# Patient Record
Sex: MALE | Race: WHITE | NOT HISPANIC OR LATINO | Employment: FULL TIME | URBAN - METROPOLITAN AREA
[De-identification: names, ages, dates, MRNs, and addresses within clinical notes are randomized per-mention and may not be internally consistent; named-entity substitution may affect disease eponyms.]

---

## 2023-09-19 PROBLEM — F32.2 MAJOR DEPRESSIVE DISORDER, SINGLE EPISODE, SEVERE WITHOUT PSYCHOTIC FEATURES (MULTI): Status: ACTIVE | Noted: 2023-09-19

## 2023-09-19 PROBLEM — I10 HYPERTENSION: Status: ACTIVE | Noted: 2023-09-19

## 2023-09-19 PROBLEM — T14.8XXA FOREIGN BODY IN SKIN: Status: ACTIVE | Noted: 2023-09-19

## 2023-09-19 PROBLEM — I50.9 ACUTE CONGESTIVE HEART FAILURE (MULTI): Status: ACTIVE | Noted: 2023-09-19

## 2023-09-19 PROBLEM — R94.31 ELECTROCARDIOGRAM ABNORMAL: Status: ACTIVE | Noted: 2023-09-19

## 2023-09-19 PROBLEM — I50.22 CHRONIC SYSTOLIC HEART FAILURE (MULTI): Status: ACTIVE | Noted: 2023-09-19

## 2023-09-19 PROBLEM — E87.1 HYPONATREMIA: Status: ACTIVE | Noted: 2023-09-19

## 2023-09-19 PROBLEM — I48.0 PAROXYSMAL ATRIAL FIBRILLATION (MULTI): Status: ACTIVE | Noted: 2023-09-19

## 2023-09-19 PROBLEM — I48.91 A-FIB (MULTI): Status: ACTIVE | Noted: 2023-09-19

## 2023-09-19 PROBLEM — K29.70 GASTRITIS: Status: ACTIVE | Noted: 2023-09-19

## 2023-09-19 PROBLEM — F32.9 MAJOR DEPRESSIVE DISORDER: Status: ACTIVE | Noted: 2023-09-19

## 2023-09-19 PROBLEM — J20.9 ACUTE BRONCHITIS: Status: ACTIVE | Noted: 2023-09-19

## 2023-09-19 PROBLEM — I50.9 HEART FAILURE (MULTI): Status: ACTIVE | Noted: 2023-09-19

## 2023-09-19 PROBLEM — F19.10 POLYSUBSTANCE ABUSE (MULTI): Status: ACTIVE | Noted: 2023-09-19

## 2023-09-19 PROBLEM — F17.213 NICOTINE DEPENDENCE, CIGARETTES, WITH WITHDRAWAL: Status: ACTIVE | Noted: 2023-09-19

## 2023-09-19 PROBLEM — R10.13 EPIGASTRIC PAIN: Status: ACTIVE | Noted: 2023-09-19

## 2023-09-19 PROBLEM — R30.0 DYSURIA: Status: ACTIVE | Noted: 2023-09-19

## 2023-09-19 PROBLEM — I25.10 CAD (CORONARY ARTERY DISEASE): Status: ACTIVE | Noted: 2023-09-19

## 2023-09-19 PROBLEM — I25.10 CORONARY ARTERIOSCLEROSIS: Status: ACTIVE | Noted: 2023-09-19

## 2023-09-19 PROBLEM — I42.9 CARDIOMYOPATHY (MULTI): Status: ACTIVE | Noted: 2023-09-19

## 2023-09-19 PROBLEM — B02.9 HERPES ZOSTER: Status: ACTIVE | Noted: 2023-09-19

## 2023-09-19 PROBLEM — J44.9 COPD (CHRONIC OBSTRUCTIVE PULMONARY DISEASE) (MULTI): Status: ACTIVE | Noted: 2023-09-19

## 2023-09-19 PROBLEM — F10.10 ALCOHOL ABUSE, UNCOMPLICATED: Status: ACTIVE | Noted: 2023-09-19

## 2023-09-19 PROBLEM — R60.9 EDEMA: Status: ACTIVE | Noted: 2023-09-19

## 2023-09-19 PROBLEM — E78.5 HYPERLIPIDEMIA: Status: ACTIVE | Noted: 2023-09-19

## 2023-09-19 PROBLEM — N20.0 KIDNEY STONE: Status: ACTIVE | Noted: 2023-09-19

## 2023-09-19 PROBLEM — F99 INSOMNIA DUE TO OTHER MENTAL DISORDER: Status: ACTIVE | Noted: 2023-09-19

## 2023-09-19 PROBLEM — R33.8 ACUTE RETENTION OF URINE: Status: ACTIVE | Noted: 2023-09-19

## 2023-09-19 PROBLEM — F51.05 INSOMNIA DUE TO OTHER MENTAL DISORDER: Status: ACTIVE | Noted: 2023-09-19

## 2023-09-19 PROBLEM — R94.30 CARDIAC LV EJECTION FRACTION 21-30%: Status: ACTIVE | Noted: 2023-09-19

## 2023-09-19 PROBLEM — I25.5 ISCHEMIC CARDIOMYOPATHY: Status: ACTIVE | Noted: 2023-09-19

## 2023-09-19 PROBLEM — F14.10 COCAINE ABUSE (MULTI): Status: ACTIVE | Noted: 2023-09-19

## 2023-09-19 PROBLEM — R93.89 ABNORMAL COMPUTED TOMOGRAPHY SCAN: Status: ACTIVE | Noted: 2023-09-19

## 2023-09-19 PROBLEM — E11.9 DIABETES MELLITUS (MULTI): Status: ACTIVE | Noted: 2023-09-19

## 2023-09-19 RX ORDER — SUCRALFATE 1 G/1
1 TABLET ORAL EVERY 12 HOURS
COMMUNITY
Start: 2021-11-08

## 2023-09-19 RX ORDER — DICYCLOMINE HYDROCHLORIDE 10 MG/1
1 CAPSULE ORAL DAILY
COMMUNITY
Start: 2021-11-05

## 2023-09-19 RX ORDER — ASPIRIN 81 MG/1
1 TABLET ORAL DAILY
COMMUNITY
Start: 2021-10-07

## 2023-09-19 RX ORDER — PANTOPRAZOLE SODIUM 40 MG/1
1 TABLET, DELAYED RELEASE ORAL DAILY
COMMUNITY
Start: 2022-12-01

## 2023-09-19 RX ORDER — OXYCODONE AND ACETAMINOPHEN 5; 325 MG/1; MG/1
1 TABLET ORAL AS NEEDED
COMMUNITY

## 2023-09-19 RX ORDER — LANOLIN ALCOHOL/MO/W.PET/CERES
1 CREAM (GRAM) TOPICAL DAILY
COMMUNITY
Start: 2021-11-05

## 2023-09-19 RX ORDER — METOPROLOL SUCCINATE 50 MG/1
1 TABLET, EXTENDED RELEASE ORAL DAILY
Status: ON HOLD | COMMUNITY
Start: 2022-12-01 | End: 2023-12-25 | Stop reason: SDUPTHER

## 2023-09-19 RX ORDER — METFORMIN HYDROCHLORIDE 500 MG/1
1 TABLET ORAL 2 TIMES DAILY
COMMUNITY
Start: 2021-10-03

## 2023-09-19 RX ORDER — CARVEDILOL 3.12 MG/1
1 TABLET ORAL
COMMUNITY
Start: 2013-04-09

## 2023-09-19 RX ORDER — CIPROFLOXACIN 500 MG/1
1 TABLET ORAL EVERY 12 HOURS
COMMUNITY
End: 2023-12-25 | Stop reason: HOSPADM

## 2023-09-19 RX ORDER — SPIRONOLACTONE 25 MG/1
1 TABLET ORAL DAILY
COMMUNITY
Start: 2021-10-03

## 2023-09-19 RX ORDER — METOPROLOL TARTRATE 100 MG/1
1 TABLET ORAL EVERY 12 HOURS
COMMUNITY
Start: 2021-10-03 | End: 2023-12-25 | Stop reason: HOSPADM

## 2023-09-19 RX ORDER — FUROSEMIDE 40 MG/1
1 TABLET ORAL DAILY
COMMUNITY
Start: 2022-08-22

## 2023-09-19 RX ORDER — LISINOPRIL 5 MG/1
1 TABLET ORAL DAILY
COMMUNITY
Start: 2013-04-09 | End: 2023-12-25 | Stop reason: HOSPADM

## 2023-09-19 RX ORDER — ATORVASTATIN CALCIUM 40 MG/1
1 TABLET, FILM COATED ORAL DAILY
COMMUNITY
Start: 2021-10-03

## 2023-09-19 RX ORDER — DAPAGLIFLOZIN 10 MG/1
1 TABLET, FILM COATED ORAL DAILY
COMMUNITY
Start: 2021-10-07

## 2023-10-02 ENCOUNTER — HOSPITAL ENCOUNTER (OUTPATIENT)
Dept: CARDIOLOGY | Facility: CLINIC | Age: 53
Discharge: HOME | End: 2023-10-02

## 2023-10-10 DIAGNOSIS — Z95.810 AICD (AUTOMATIC CARDIOVERTER/DEFIBRILLATOR) PRESENT: ICD-10-CM

## 2023-10-10 DIAGNOSIS — I50.43 ACUTE ON CHRONIC COMBINED SYSTOLIC AND DIASTOLIC HEART FAILURE (MULTI): ICD-10-CM

## 2023-10-10 DIAGNOSIS — I25.5 ISCHEMIC CARDIOMYOPATHY: Primary | ICD-10-CM

## 2023-10-10 DIAGNOSIS — I48.0 PAROXYSMAL ATRIAL FIBRILLATION (MULTI): ICD-10-CM

## 2023-10-23 ENCOUNTER — HOSPITAL ENCOUNTER (OUTPATIENT)
Dept: CARDIOLOGY | Facility: CLINIC | Age: 53
Discharge: HOME | End: 2023-10-23

## 2023-10-23 DIAGNOSIS — I48.0 PAROXYSMAL ATRIAL FIBRILLATION (MULTI): ICD-10-CM

## 2023-10-23 DIAGNOSIS — I25.5 ISCHEMIC CARDIOMYOPATHY: ICD-10-CM

## 2023-10-23 DIAGNOSIS — Z95.810 AICD (AUTOMATIC CARDIOVERTER/DEFIBRILLATOR) PRESENT: ICD-10-CM

## 2023-10-23 DIAGNOSIS — I50.43 ACUTE ON CHRONIC COMBINED SYSTOLIC AND DIASTOLIC HEART FAILURE (MULTI): ICD-10-CM

## 2023-11-21 ENCOUNTER — HOSPITAL ENCOUNTER (OUTPATIENT)
Dept: CARDIOLOGY | Facility: CLINIC | Age: 53
Discharge: HOME | End: 2023-11-21

## 2023-12-11 ENCOUNTER — HOSPITAL ENCOUNTER (OUTPATIENT)
Dept: CARDIOLOGY | Facility: CLINIC | Age: 53
Discharge: HOME | End: 2023-12-11

## 2023-12-18 ENCOUNTER — HOSPITAL ENCOUNTER (OUTPATIENT)
Dept: CARDIOLOGY | Facility: CLINIC | Age: 53
Discharge: HOME | End: 2023-12-18

## 2023-12-22 ENCOUNTER — HOSPITAL ENCOUNTER (OUTPATIENT)
Facility: HOSPITAL | Age: 53
Setting detail: OBSERVATION
Discharge: HOME | End: 2023-12-25
Attending: STUDENT IN AN ORGANIZED HEALTH CARE EDUCATION/TRAINING PROGRAM | Admitting: INTERNAL MEDICINE

## 2023-12-22 ENCOUNTER — APPOINTMENT (OUTPATIENT)
Dept: CARDIOLOGY | Facility: HOSPITAL | Age: 53
End: 2023-12-22

## 2023-12-22 ENCOUNTER — APPOINTMENT (OUTPATIENT)
Dept: RADIOLOGY | Facility: HOSPITAL | Age: 53
End: 2023-12-22

## 2023-12-22 DIAGNOSIS — I50.9 ACUTE ON CHRONIC HEART FAILURE, UNSPECIFIED HEART FAILURE TYPE (MULTI): ICD-10-CM

## 2023-12-22 DIAGNOSIS — I50.9 ACUTE ON CHRONIC CONGESTIVE HEART FAILURE, UNSPECIFIED HEART FAILURE TYPE (MULTI): Primary | ICD-10-CM

## 2023-12-22 DIAGNOSIS — R06.02 SHORTNESS OF BREATH: ICD-10-CM

## 2023-12-22 DIAGNOSIS — R09.02 HYPOXEMIA: ICD-10-CM

## 2023-12-22 LAB
ALBUMIN SERPL-MCNC: 3.3 G/DL (ref 3.5–5)
ALP BLD-CCNC: 147 U/L (ref 35–125)
ALT SERPL-CCNC: 12 U/L (ref 5–40)
AMPHETAMINES UR QL SCN>1000 NG/ML: NEGATIVE
ANION GAP SERPL CALC-SCNC: 13 MMOL/L
APPEARANCE UR: CLEAR
AST SERPL-CCNC: 19 U/L (ref 5–40)
BARBITURATES UR QL SCN>300 NG/ML: NEGATIVE
BASOPHILS # BLD AUTO: 0.09 X10*3/UL (ref 0–0.1)
BASOPHILS NFR BLD AUTO: 1.3 %
BENZODIAZ UR QL SCN>300 NG/ML: NEGATIVE
BILIRUB SERPL-MCNC: 2.1 MG/DL (ref 0.1–1.2)
BILIRUB UR STRIP.AUTO-MCNC: ABNORMAL MG/DL
BUN SERPL-MCNC: 18 MG/DL (ref 8–25)
BZE UR QL SCN>300 NG/ML: POSITIVE
CALCIUM SERPL-MCNC: 8.1 MG/DL (ref 8.5–10.4)
CANNABINOIDS UR QL SCN>50 NG/ML: NEGATIVE
CHLORIDE SERPL-SCNC: 102 MMOL/L (ref 97–107)
CO2 SERPL-SCNC: 21 MMOL/L (ref 24–31)
COLOR UR: YELLOW
CREAT SERPL-MCNC: 0.9 MG/DL (ref 0.4–1.6)
EOSINOPHIL # BLD AUTO: 0.15 X10*3/UL (ref 0–0.7)
EOSINOPHIL NFR BLD AUTO: 2.1 %
ERYTHROCYTE [DISTWIDTH] IN BLOOD BY AUTOMATED COUNT: 17.2 % (ref 11.5–14.5)
FENTANYL+NORFENTANYL UR QL SCN: NEGATIVE
FLUAV RNA RESP QL NAA+PROBE: NOT DETECTED
FLUBV RNA RESP QL NAA+PROBE: NOT DETECTED
GFR SERPL CREATININE-BSD FRML MDRD: >90 ML/MIN/1.73M*2
GLUCOSE BLD MANUAL STRIP-MCNC: 138 MG/DL (ref 74–99)
GLUCOSE SERPL-MCNC: 115 MG/DL (ref 65–99)
GLUCOSE UR STRIP.AUTO-MCNC: NORMAL MG/DL
HCT VFR BLD AUTO: 42.5 % (ref 41–52)
HGB BLD-MCNC: 12.9 G/DL (ref 13.5–17.5)
HOLD SPECIMEN: NORMAL
IMM GRANULOCYTES # BLD AUTO: 0.02 X10*3/UL (ref 0–0.7)
IMM GRANULOCYTES NFR BLD AUTO: 0.3 % (ref 0–0.9)
KETONES UR STRIP.AUTO-MCNC: NEGATIVE MG/DL
LEUKOCYTE ESTERASE UR QL STRIP.AUTO: NEGATIVE
LYMPHOCYTES # BLD AUTO: 2.16 X10*3/UL (ref 1.2–4.8)
LYMPHOCYTES NFR BLD AUTO: 30.5 %
MAGNESIUM SERPL-MCNC: 2.1 MG/DL (ref 1.6–3.1)
MAGNESIUM SERPL-MCNC: 2.2 MG/DL (ref 1.6–3.1)
MCH RBC QN AUTO: 26 PG (ref 26–34)
MCHC RBC AUTO-ENTMCNC: 30.4 G/DL (ref 32–36)
MCV RBC AUTO: 86 FL (ref 80–100)
METHADONE UR QL SCN>300 NG/ML: NEGATIVE
MONOCYTES # BLD AUTO: 0.86 X10*3/UL (ref 0.1–1)
MONOCYTES NFR BLD AUTO: 12.1 %
MUCOUS THREADS #/AREA URNS AUTO: NORMAL /LPF
NEUTROPHILS # BLD AUTO: 3.81 X10*3/UL (ref 1.2–7.7)
NEUTROPHILS NFR BLD AUTO: 53.7 %
NITRITE UR QL STRIP.AUTO: NEGATIVE
NRBC BLD-RTO: 0 /100 WBCS (ref 0–0)
NT-PROBNP SERPL-MCNC: 1765 PG/ML (ref 0–138)
OPIATES UR QL SCN>300 NG/ML: NEGATIVE
OXYCODONE UR QL: NEGATIVE
PCP UR QL SCN>25 NG/ML: NEGATIVE
PH UR STRIP.AUTO: 5.5 [PH]
PLATELET # BLD AUTO: 265 X10*3/UL (ref 150–450)
POTASSIUM SERPL-SCNC: 4 MMOL/L (ref 3.4–5.1)
PROT SERPL-MCNC: 6.8 G/DL (ref 5.9–7.9)
PROT UR STRIP.AUTO-MCNC: ABNORMAL MG/DL
Q ONSET: 205 MS
QRS COUNT: 17 BEATS
QRS DURATION: 110 MS
QT INTERVAL: 378 MS
QTC CALCULATION(BAZETT): 504 MS
QTC FREDERICIA: 458 MS
R AXIS: -47 DEGREES
RBC # BLD AUTO: 4.97 X10*6/UL (ref 4.5–5.9)
RBC # UR STRIP.AUTO: NEGATIVE /UL
RBC #/AREA URNS AUTO: NORMAL /HPF
RSV RNA RESP QL NAA+PROBE: NOT DETECTED
SARS-COV-2 RNA RESP QL NAA+PROBE: NOT DETECTED
SODIUM SERPL-SCNC: 136 MMOL/L (ref 133–145)
SP GR UR STRIP.AUTO: 1.03
T AXIS: 128 DEGREES
T OFFSET: 394 MS
TROPONIN T SERPL-MCNC: 23 NG/L
TROPONIN T SERPL-MCNC: 25 NG/L
TROPONIN T SERPL-MCNC: 25 NG/L
UROBILINOGEN UR STRIP.AUTO-MCNC: ABNORMAL MG/DL
VENTRICULAR RATE: 107 BPM
WBC # BLD AUTO: 7.1 X10*3/UL (ref 4.4–11.3)
WBC #/AREA URNS AUTO: NORMAL /HPF

## 2023-12-22 PROCEDURE — 2500000004 HC RX 250 GENERAL PHARMACY W/ HCPCS (ALT 636 FOR OP/ED)

## 2023-12-22 PROCEDURE — 36415 COLL VENOUS BLD VENIPUNCTURE: CPT | Performed by: EMERGENCY MEDICINE

## 2023-12-22 PROCEDURE — 80053 COMPREHEN METABOLIC PANEL: CPT | Performed by: EMERGENCY MEDICINE

## 2023-12-22 PROCEDURE — 82947 ASSAY GLUCOSE BLOOD QUANT: CPT

## 2023-12-22 PROCEDURE — 80307 DRUG TEST PRSMV CHEM ANLYZR: CPT | Performed by: STUDENT IN AN ORGANIZED HEALTH CARE EDUCATION/TRAINING PROGRAM

## 2023-12-22 PROCEDURE — 71045 X-RAY EXAM CHEST 1 VIEW: CPT

## 2023-12-22 PROCEDURE — 84484 ASSAY OF TROPONIN QUANT: CPT | Performed by: EMERGENCY MEDICINE

## 2023-12-22 PROCEDURE — 2500000002 HC RX 250 W HCPCS SELF ADMINISTERED DRUGS (ALT 637 FOR MEDICARE OP, ALT 636 FOR OP/ED): Performed by: INTERNAL MEDICINE

## 2023-12-22 PROCEDURE — 2500000004 HC RX 250 GENERAL PHARMACY W/ HCPCS (ALT 636 FOR OP/ED): Performed by: INTERNAL MEDICINE

## 2023-12-22 PROCEDURE — 83735 ASSAY OF MAGNESIUM: CPT | Performed by: INTERNAL MEDICINE

## 2023-12-22 PROCEDURE — 83880 ASSAY OF NATRIURETIC PEPTIDE: CPT | Performed by: EMERGENCY MEDICINE

## 2023-12-22 PROCEDURE — 2500000004 HC RX 250 GENERAL PHARMACY W/ HCPCS (ALT 636 FOR OP/ED): Performed by: STUDENT IN AN ORGANIZED HEALTH CARE EDUCATION/TRAINING PROGRAM

## 2023-12-22 PROCEDURE — 2500000001 HC RX 250 WO HCPCS SELF ADMINISTERED DRUGS (ALT 637 FOR MEDICARE OP): Performed by: INTERNAL MEDICINE

## 2023-12-22 PROCEDURE — 93005 ELECTROCARDIOGRAM TRACING: CPT

## 2023-12-22 PROCEDURE — 85025 COMPLETE CBC W/AUTO DIFF WBC: CPT | Performed by: EMERGENCY MEDICINE

## 2023-12-22 PROCEDURE — 83735 ASSAY OF MAGNESIUM: CPT | Performed by: EMERGENCY MEDICINE

## 2023-12-22 PROCEDURE — 84075 ASSAY ALKALINE PHOSPHATASE: CPT | Performed by: EMERGENCY MEDICINE

## 2023-12-22 PROCEDURE — 81001 URINALYSIS AUTO W/SCOPE: CPT | Performed by: STUDENT IN AN ORGANIZED HEALTH CARE EDUCATION/TRAINING PROGRAM

## 2023-12-22 PROCEDURE — 99285 EMERGENCY DEPT VISIT HI MDM: CPT | Performed by: STUDENT IN AN ORGANIZED HEALTH CARE EDUCATION/TRAINING PROGRAM

## 2023-12-22 PROCEDURE — S0166 INJ OLANZAPINE 2.5MG: HCPCS | Performed by: STUDENT IN AN ORGANIZED HEALTH CARE EDUCATION/TRAINING PROGRAM

## 2023-12-22 PROCEDURE — 87637 SARSCOV2&INF A&B&RSV AMP PRB: CPT | Performed by: STUDENT IN AN ORGANIZED HEALTH CARE EDUCATION/TRAINING PROGRAM

## 2023-12-22 PROCEDURE — G0378 HOSPITAL OBSERVATION PER HR: HCPCS

## 2023-12-22 RX ORDER — DIPHENHYDRAMINE HYDROCHLORIDE 50 MG/ML
25 INJECTION INTRAMUSCULAR; INTRAVENOUS ONCE
Status: COMPLETED | OUTPATIENT
Start: 2023-12-22 | End: 2023-12-22

## 2023-12-22 RX ORDER — ASPIRIN 81 MG/1
81 TABLET ORAL DAILY
Status: DISCONTINUED | OUTPATIENT
Start: 2023-12-22 | End: 2023-12-25 | Stop reason: HOSPADM

## 2023-12-22 RX ORDER — SPIRONOLACTONE 25 MG/1
25 TABLET ORAL DAILY
Status: DISCONTINUED | OUTPATIENT
Start: 2023-12-22 | End: 2023-12-25 | Stop reason: HOSPADM

## 2023-12-22 RX ORDER — FUROSEMIDE 40 MG/1
40 TABLET ORAL DAILY
Status: DISCONTINUED | OUTPATIENT
Start: 2023-12-22 | End: 2023-12-22 | Stop reason: ALTCHOICE

## 2023-12-22 RX ORDER — ATORVASTATIN CALCIUM 40 MG/1
40 TABLET, FILM COATED ORAL NIGHTLY
Status: DISCONTINUED | OUTPATIENT
Start: 2023-12-22 | End: 2023-12-25 | Stop reason: HOSPADM

## 2023-12-22 RX ORDER — MAGNESIUM SULFATE HEPTAHYDRATE 40 MG/ML
2 INJECTION, SOLUTION INTRAVENOUS ONCE
Status: DISCONTINUED | OUTPATIENT
Start: 2023-12-22 | End: 2023-12-22 | Stop reason: ALTCHOICE

## 2023-12-22 RX ORDER — CARVEDILOL 3.12 MG/1
3.12 TABLET ORAL
Status: DISCONTINUED | OUTPATIENT
Start: 2023-12-22 | End: 2023-12-25 | Stop reason: HOSPADM

## 2023-12-22 RX ORDER — OXYCODONE AND ACETAMINOPHEN 5; 325 MG/1; MG/1
1 TABLET ORAL DAILY PRN
Status: DISCONTINUED | OUTPATIENT
Start: 2023-12-22 | End: 2023-12-25 | Stop reason: HOSPADM

## 2023-12-22 RX ORDER — LANOLIN ALCOHOL/MO/W.PET/CERES
100 CREAM (GRAM) TOPICAL DAILY
Status: DISCONTINUED | OUTPATIENT
Start: 2023-12-22 | End: 2023-12-25 | Stop reason: HOSPADM

## 2023-12-22 RX ORDER — MAGNESIUM SULFATE 4 G/50ML
4 INJECTION INTRAVENOUS ONCE AS NEEDED
Status: DISCONTINUED | OUTPATIENT
Start: 2023-12-22 | End: 2023-12-25 | Stop reason: HOSPADM

## 2023-12-22 RX ORDER — ACETAMINOPHEN 650 MG/1
650 SUPPOSITORY RECTAL EVERY 6 HOURS PRN
Status: DISCONTINUED | OUTPATIENT
Start: 2023-12-22 | End: 2023-12-25 | Stop reason: HOSPADM

## 2023-12-22 RX ORDER — DICYCLOMINE HYDROCHLORIDE 10 MG/1
10 CAPSULE ORAL DAILY
Status: DISCONTINUED | OUTPATIENT
Start: 2023-12-22 | End: 2023-12-25 | Stop reason: HOSPADM

## 2023-12-22 RX ORDER — SUCRALFATE 1 G/1
1 TABLET ORAL EVERY 12 HOURS SCHEDULED
Status: DISCONTINUED | OUTPATIENT
Start: 2023-12-22 | End: 2023-12-25 | Stop reason: HOSPADM

## 2023-12-22 RX ORDER — METOPROLOL TARTRATE 100 MG/1
100 TABLET ORAL EVERY 12 HOURS
Status: DISCONTINUED | OUTPATIENT
Start: 2023-12-22 | End: 2023-12-22 | Stop reason: ALTCHOICE

## 2023-12-22 RX ORDER — OLANZAPINE 10 MG/2ML
2.5 INJECTION, POWDER, FOR SOLUTION INTRAMUSCULAR ONCE
Status: COMPLETED | OUTPATIENT
Start: 2023-12-22 | End: 2023-12-22

## 2023-12-22 RX ORDER — ACETAMINOPHEN 325 MG/1
650 TABLET ORAL EVERY 6 HOURS PRN
Status: DISCONTINUED | OUTPATIENT
Start: 2023-12-22 | End: 2023-12-25 | Stop reason: HOSPADM

## 2023-12-22 RX ORDER — FUROSEMIDE 10 MG/ML
40 INJECTION INTRAMUSCULAR; INTRAVENOUS EVERY 8 HOURS
Status: COMPLETED | OUTPATIENT
Start: 2023-12-22 | End: 2023-12-23

## 2023-12-22 RX ORDER — DAPAGLIFLOZIN 10 MG/1
10 TABLET, FILM COATED ORAL DAILY
Status: DISCONTINUED | OUTPATIENT
Start: 2023-12-22 | End: 2023-12-25 | Stop reason: HOSPADM

## 2023-12-22 RX ORDER — LISINOPRIL 5 MG/1
5 TABLET ORAL DAILY
Status: DISCONTINUED | OUTPATIENT
Start: 2023-12-22 | End: 2023-12-22 | Stop reason: ALTCHOICE

## 2023-12-22 RX ORDER — MIDAZOLAM HYDROCHLORIDE 1 MG/ML
2 INJECTION, SOLUTION INTRAMUSCULAR; INTRAVENOUS ONCE
Status: COMPLETED | OUTPATIENT
Start: 2023-12-22 | End: 2023-12-22

## 2023-12-22 RX ORDER — METOPROLOL SUCCINATE 50 MG/1
50 TABLET, EXTENDED RELEASE ORAL DAILY
Status: DISCONTINUED | OUTPATIENT
Start: 2023-12-22 | End: 2023-12-22 | Stop reason: ALTCHOICE

## 2023-12-22 RX ORDER — METFORMIN HYDROCHLORIDE 500 MG/1
500 TABLET ORAL
Status: DISCONTINUED | OUTPATIENT
Start: 2023-12-22 | End: 2023-12-25 | Stop reason: HOSPADM

## 2023-12-22 RX ORDER — MAGNESIUM SULFATE HEPTAHYDRATE 40 MG/ML
2 INJECTION, SOLUTION INTRAVENOUS ONCE AS NEEDED
Status: DISCONTINUED | OUTPATIENT
Start: 2023-12-22 | End: 2023-12-25 | Stop reason: HOSPADM

## 2023-12-22 RX ORDER — PANTOPRAZOLE SODIUM 40 MG/1
40 TABLET, DELAYED RELEASE ORAL
Status: DISCONTINUED | OUTPATIENT
Start: 2023-12-23 | End: 2023-12-25 | Stop reason: HOSPADM

## 2023-12-22 RX ORDER — HEPARIN SODIUM 5000 [USP'U]/ML
5000 INJECTION, SOLUTION INTRAVENOUS; SUBCUTANEOUS EVERY 8 HOURS SCHEDULED
Status: DISCONTINUED | OUTPATIENT
Start: 2023-12-22 | End: 2023-12-22 | Stop reason: ALTCHOICE

## 2023-12-22 RX ORDER — ACETAMINOPHEN 160 MG/5ML
650 SOLUTION ORAL EVERY 6 HOURS PRN
Status: DISCONTINUED | OUTPATIENT
Start: 2023-12-22 | End: 2023-12-25 | Stop reason: HOSPADM

## 2023-12-22 RX ORDER — POTASSIUM CHLORIDE 20 MEQ/1
20 TABLET, EXTENDED RELEASE ORAL DAILY
Status: DISCONTINUED | OUTPATIENT
Start: 2023-12-22 | End: 2023-12-25 | Stop reason: HOSPADM

## 2023-12-22 RX ADMIN — DAPAGLIFLOZIN 10 MG: 10 TABLET, FILM COATED ORAL at 17:02

## 2023-12-22 RX ADMIN — POTASSIUM CHLORIDE 20 MEQ: 1500 TABLET, EXTENDED RELEASE ORAL at 16:18

## 2023-12-22 RX ADMIN — METFORMIN HYDROCHLORIDE 500 MG: 500 TABLET, FILM COATED ORAL at 16:18

## 2023-12-22 RX ADMIN — CARVEDILOL 3.12 MG: 3.12 TABLET, FILM COATED ORAL at 16:18

## 2023-12-22 RX ADMIN — MIDAZOLAM HYDROCHLORIDE 2 MG: 1 INJECTION, SOLUTION INTRAMUSCULAR; INTRAVENOUS at 07:15

## 2023-12-22 RX ADMIN — ATORVASTATIN CALCIUM 40 MG: 40 TABLET, FILM COATED ORAL at 21:42

## 2023-12-22 RX ADMIN — OLANZAPINE 2.5 MG: 10 INJECTION, POWDER, FOR SOLUTION INTRAMUSCULAR at 07:25

## 2023-12-22 RX ADMIN — DICYCLOMINE HYDROCHLORIDE 10 MG: 10 CAPSULE ORAL at 16:18

## 2023-12-22 RX ADMIN — SPIRONOLACTONE 25 MG: 25 TABLET ORAL at 16:18

## 2023-12-22 RX ADMIN — SACUBITRIL AND VALSARTAN 1 TABLET: 24; 26 TABLET, FILM COATED ORAL at 22:33

## 2023-12-22 RX ADMIN — SUCRALFATE 1 G: 1 TABLET ORAL at 21:42

## 2023-12-22 RX ADMIN — DIPHENHYDRAMINE HYDROCHLORIDE 25 MG: 50 INJECTION, SOLUTION INTRAMUSCULAR; INTRAVENOUS at 06:36

## 2023-12-22 RX ADMIN — FUROSEMIDE 40 MG: 10 INJECTION, SOLUTION INTRAMUSCULAR; INTRAVENOUS at 22:42

## 2023-12-22 RX ADMIN — FUROSEMIDE 40 MG: 10 INJECTION, SOLUTION INTRAMUSCULAR; INTRAVENOUS at 16:18

## 2023-12-22 RX ADMIN — THIAMINE HCL TAB 100 MG 100 MG: 100 TAB at 16:18

## 2023-12-22 RX ADMIN — ASPIRIN 81 MG: 81 TABLET, COATED ORAL at 16:18

## 2023-12-22 RX ADMIN — RIVAROXABAN 20 MG: 20 TABLET, FILM COATED ORAL at 16:18

## 2023-12-22 SDOH — SOCIAL STABILITY: SOCIAL INSECURITY: DO YOU FEEL UNSAFE GOING BACK TO THE PLACE WHERE YOU ARE LIVING?: NO

## 2023-12-22 SDOH — SOCIAL STABILITY: SOCIAL INSECURITY: ARE THERE ANY APPARENT SIGNS OF INJURIES/BEHAVIORS THAT COULD BE RELATED TO ABUSE/NEGLECT?: NO

## 2023-12-22 SDOH — SOCIAL STABILITY: SOCIAL INSECURITY: ARE YOU OR HAVE YOU BEEN THREATENED OR ABUSED PHYSICALLY, EMOTIONALLY, OR SEXUALLY BY ANYONE?: NO

## 2023-12-22 SDOH — SOCIAL STABILITY: SOCIAL INSECURITY: WERE YOU ABLE TO COMPLETE ALL THE BEHAVIORAL HEALTH SCREENINGS?: YES

## 2023-12-22 SDOH — SOCIAL STABILITY: SOCIAL INSECURITY: HAS ANYONE EVER THREATENED TO HURT YOUR FAMILY OR YOUR PETS?: NO

## 2023-12-22 SDOH — SOCIAL STABILITY: SOCIAL INSECURITY: ABUSE: ADULT

## 2023-12-22 SDOH — SOCIAL STABILITY: SOCIAL INSECURITY: HAVE YOU HAD THOUGHTS OF HARMING ANYONE ELSE?: NO

## 2023-12-22 SDOH — SOCIAL STABILITY: SOCIAL INSECURITY: DOES ANYONE TRY TO KEEP YOU FROM HAVING/CONTACTING OTHER FRIENDS OR DOING THINGS OUTSIDE YOUR HOME?: NO

## 2023-12-22 SDOH — SOCIAL STABILITY: SOCIAL INSECURITY: DO YOU FEEL ANYONE HAS EXPLOITED OR TAKEN ADVANTAGE OF YOU FINANCIALLY OR OF YOUR PERSONAL PROPERTY?: NO

## 2023-12-22 ASSESSMENT — LIFESTYLE VARIABLES
ORIENTATION AND CLOUDING OF SENSORIUM: ORIENTED AND CAN DO SERIAL ADDITIONS
HOW MANY STANDARD DRINKS CONTAINING ALCOHOL DO YOU HAVE ON A TYPICAL DAY: PATIENT DOES NOT DRINK
EVER HAD A DRINK FIRST THING IN THE MORNING TO STEADY YOUR NERVES TO GET RID OF A HANGOVER: NO
AGITATION: SOMEWHAT MORE THAN NORMAL ACTIVITY
HEADACHE, FULLNESS IN HEAD: NOT PRESENT
HOW OFTEN DO YOU HAVE A DRINK CONTAINING ALCOHOL: NEVER
TOTAL SCORE: 2
HAVE PEOPLE ANNOYED YOU BY CRITICIZING YOUR DRINKING: NO
VISUAL DISTURBANCES: NOT PRESENT
PAROXYSMAL SWEATS: NO SWEAT VISIBLE
NAUSEA AND VOMITING: NO NAUSEA AND NO VOMITING
HOW OFTEN DO YOU HAVE 6 OR MORE DRINKS ON ONE OCCASION: NEVER
SKIP TO QUESTIONS 9-10: 1
REASON UNABLE TO ASSESS: NO
EVER FELT BAD OR GUILTY ABOUT YOUR DRINKING: NO
ANXIETY: MILDLY ANXIOUS
AUDIT-C TOTAL SCORE: 0
AUDIT-C TOTAL SCORE: 0
TREMOR: NO TREMOR
HAVE YOU EVER FELT YOU SHOULD CUT DOWN ON YOUR DRINKING: NO
AUDITORY DISTURBANCES: NOT PRESENT

## 2023-12-22 ASSESSMENT — COGNITIVE AND FUNCTIONAL STATUS - GENERAL
PATIENT BASELINE BEDBOUND: NO
MOBILITY SCORE: 24
DAILY ACTIVITIY SCORE: 24

## 2023-12-22 ASSESSMENT — PATIENT HEALTH QUESTIONNAIRE - PHQ9
1. LITTLE INTEREST OR PLEASURE IN DOING THINGS: NOT AT ALL
2. FEELING DOWN, DEPRESSED OR HOPELESS: NOT AT ALL
SUM OF ALL RESPONSES TO PHQ9 QUESTIONS 1 & 2: 0

## 2023-12-22 ASSESSMENT — ACTIVITIES OF DAILY LIVING (ADL)
DRESSING YOURSELF: INDEPENDENT
WALKS IN HOME: INDEPENDENT
LACK_OF_TRANSPORTATION: NO
FEEDING YOURSELF: INDEPENDENT
BATHING: INDEPENDENT
JUDGMENT_ADEQUATE_SAFELY_COMPLETE_DAILY_ACTIVITIES: NO
GROOMING: INDEPENDENT
ADEQUATE_TO_COMPLETE_ADL: NO
HEARING - LEFT EAR: FUNCTIONAL
PATIENT'S MEMORY ADEQUATE TO SAFELY COMPLETE DAILY ACTIVITIES?: NO
HEARING - RIGHT EAR: FUNCTIONAL
TOILETING: INDEPENDENT

## 2023-12-22 ASSESSMENT — PAIN SCALES - GENERAL
PAINLEVEL_OUTOF10: 10 - WORST POSSIBLE PAIN
PAINLEVEL_OUTOF10: 10 - WORST POSSIBLE PAIN
PAINLEVEL_OUTOF10: 0 - NO PAIN

## 2023-12-22 ASSESSMENT — COLUMBIA-SUICIDE SEVERITY RATING SCALE - C-SSRS
5. HAVE YOU STARTED TO WORK OUT OR WORKED OUT THE DETAILS OF HOW TO KILL YOURSELF? DO YOU INTEND TO CARRY OUT THIS PLAN?: NO
4. HAVE YOU HAD THESE THOUGHTS AND HAD SOME INTENTION OF ACTING ON THEM?: NO
6. HAVE YOU EVER DONE ANYTHING, STARTED TO DO ANYTHING, OR PREPARED TO DO ANYTHING TO END YOUR LIFE?: NO
1. IN THE PAST MONTH, HAVE YOU WISHED YOU WERE DEAD OR WISHED YOU COULD GO TO SLEEP AND NOT WAKE UP?: YES
2. HAVE YOU ACTUALLY HAD ANY THOUGHTS OF KILLING YOURSELF?: YES

## 2023-12-22 ASSESSMENT — PAIN - FUNCTIONAL ASSESSMENT
PAIN_FUNCTIONAL_ASSESSMENT: 0-10
PAIN_FUNCTIONAL_ASSESSMENT: 0-10

## 2023-12-22 NOTE — CARE PLAN
Unable to assess pt at this time; pt appears to be uncomfortable. He is moving all over the bed; he has a rash all over his body and her E.D. notes, the pt c/o itching and burning.   Scabs noted on arms, scratches on abd.  Per MD notes, pt has a hx of CHF, HTN, A-Fib, DM, SOB and flu like sx.  No H & P at this time      DISCHARGE PLAN: DO NOT DISCHARGE PATIENT BEFORE SPEAKING WITH CARE COORDINATION; PT DOES NOT YET HAVE A SAFE DISCHARGE PLAN IN PLACE

## 2023-12-22 NOTE — ED NOTES
Rash Patient has body rash that itches and burns       Shortness of Breath Patient has shortness of breath 87% on ra 93% on 2l       Weakness, Gen Patient has general weakness and pain all over      Patient reports  that he had a defibrillator placed a few months ago and has gotten worse since then patient has had an itchy body rash that is prominent for 3 months that he says was never diagnosed      Marlena Lieberman, JENNIFER  12/22/23 0617

## 2023-12-22 NOTE — ED PROVIDER NOTES
HPI   Chief Complaint   Patient presents with    Rash     Patient has body rash that itches and burns    Shortness of Breath     Patient has shortness of breath 87% on ra 93% on 2l    Weakness, Gen     Patient has general weakness and pain all over       HPI  See MDM                  No data recorded                Patient History   No past medical history on file.  Past Surgical History:   Procedure Laterality Date    CARDIAC SURGERY       No family history on file.  Social History     Tobacco Use    Smoking status: Not on file    Smokeless tobacco: Not on file   Substance Use Topics    Alcohol use: Not on file    Drug use: Not on file       Physical Exam   ED Triage Vitals [12/22/23 0617]   Temp Heart Rate Resp BP   36.7 °C (98.1 °F) 105 20 (!) 154/113      SpO2 Temp Source Heart Rate Source Patient Position   96 % Oral Monitor Lying      BP Location FiO2 (%)     Left arm --       Physical Exam  See [facility]  ED Course & [facility]   ED Course as of 12/22/23 0805   Fri Dec 22, 2023   0611 EKG presented to me at 6:11 AM     EKG as interpreted by me shows atrial fibrillation at a rate of 107, left anterior fascicular block, no ST changes to suggest STEMI.   [DH]      ED Course User Index  [DH] Rory Haile MD         Diagnoses as of 12/22/23 0805   Acute on chronic congestive heart failure, unspecified heart failure type (CMS/HCC)   Shortness of breath   Hypoxemia       Medical Decision Making  53-year-old male with past medical history of CHF, hypertension, atrial fibrillation on Xarelto, diabetes presenting to the emergency room with complaints of shortness of breath and rash.  Patient notes that the shortness of breath has been going on for the last week and he also complains of flulike symptoms at the beginning of the week.  Patient also complains of rash all over his body namely his arms and his chest and notes that this has been going on for about a year.  Patient also complains of a great deal of difficulty with sleeping  and has not been sleeping well at home but otherwise denies any reported fevers, chills, nausea, vomiting, diarrhea, constipation, chest pain.    Vital signs reviewed: Afebrile, tachycardic, hypertensive    Exam     Constitutional: Uncomfortable appearing, restless.   Head: Normocephalic, atraumatic.   Eyes: Pupils equal bilaterally, EOM grossly intact, conjunctiva normal.  Mouth/Throat: Oropharynx is clear, moist mucus membranes.   Neck: Supple. No lymphadenopathy.  Cardiovascular: Regular rate and regular rhythm. Extremities are well-perfused.   Pulmonary/Chest: Mild to moderate respiratory distress, patient on 2 L nasal cannula.  Coarse lung sounds noted in bilateral lung fields with intermittent crackles.  Abdominal: Soft, non-tender, non-distended. No rebound or guarding.   Musculoskeletal: Mild bilateral lower extremity edema.       Skin: Scabs of different stages of healing over bilateral arms and anterior chest.  Mild erythema noted to the suprapubic region.  Neurological: Patient is oriented to person and situation. Face symmetric, hearing intact to voice, speech normal. Moves all extremities.   Psych: Agitated mood, affect, thought content, and judgment normal.    Differential includes but is not limited to:  CHF exacerbation versus ACS versus pneumonia versus viral illness versus drug intoxication versus alcohol intoxication versus metabolic disarray    Amount and/or Complexity of Data Reviewed  External Data Reviewed: notes.  Review of prior notes as well as echocardiograms from August of this year note a history of congestive heart failure with an ejection fraction of 30% with global hypokinesis.    Labs: ordered.  Labs Reviewed   CBC WITH AUTO DIFFERENTIAL - Abnormal       Result Value    WBC 7.1      nRBC 0.0      RBC 4.97      Hemoglobin 12.9 (*)     Hematocrit 42.5      MCV 86      MCH 26.0      MCHC 30.4 (*)     RDW 17.2 (*)     Platelets 265      Neutrophils % 53.7      Immature Granulocytes %,  Automated 0.3      Lymphocytes % 30.5      Monocytes % 12.1      Eosinophils % 2.1      Basophils % 1.3      Neutrophils Absolute 3.81      Immature Granulocytes Absolute, Automated 0.02      Lymphocytes Absolute 2.16      Monocytes Absolute 0.86      Eosinophils Absolute 0.15      Basophils Absolute 0.09     COMPREHENSIVE METABOLIC PANEL   MAGNESIUM   N-TERMINAL PROBNP   TROPONIN T SERIES, HIGH SENSITIVITY (0, 2 HR, 6 HR)    Narrative:     The following orders were created for panel order Troponin T Series, High Sensitivity (0, 2HR, 6HR).  Procedure                               Abnormality         Status                     ---------                               -----------         ------                     Serial Troponin, Initial...[[id number]]                      In process                 Serial Troponin, 2 Hour ...[[id number]]                                                   Please view results for these tests on the individual orders.   SERIAL TROPONIN, INITIAL ([facility])   SERIAL TROPONIN,  2 HOUR ([facility])   RSV PCR   INFLUENZA A AND B PCR   SARS-COV-2 PCR, SYMPTOMATIC   URINALYSIS WITH REFLEX CULTURE AND MICROSCOPIC    Narrative:     The following orders were created for panel order Urinalysis with Reflex Culture and Microscopic.  Procedure                               Abnormality         Status                     ---------                               -----------         ------                     Urinalysis with Reflex C...[[id number]]                                                 Extra Urine Gray Tube[[id number]]                                                         Please view results for these tests on the individual orders.   DRUG SCREEN,URINE   URINALYSIS WITH REFLEX CULTURE AND MICROSCOPIC   EXTRA URINE GRAY TUBE       Radiology: ordered and independent interpretation performed.  Chest x-ray as interpreted by me shows no large pneumothorax at this time.    ECG/medicine tests: ordered and  independent interpretation performed.  EKG as interpreted by me as detailed in ED course as above.    Lab work as interpreted by me shows no significant CBC abnormality, CMP within normal limits with no significant metabolic disarray.  Initial troponin is elevated to 25 with elevated proBNP.  No priors to compare to at this time.  Patient is otherwise negative for COVID, flu, and coronavirus at this time and chest x-ray per radiology is being read as concerning for pulmonary vascular congestion and edema.    Discussion of management or test interpretation with external provider(s):  Patient presentation discussed with Dr. Miramontes regarding concern for acute on chronic exacerbation of congestive heart failure given patient's respiratory distress and hypoxemia.  Will admit at this time for further oxygen therapy as well as diuresis.          Rory Haile MD  7:05 AM    Attending Emergency Physician  [facility] EMERGENCY MEDICINE            Procedure  Procedures     Rory Haile MD  12/22/23 0809

## 2023-12-22 NOTE — CARE PLAN
Problem: Pain  Goal: My pain/discomfort is manageable  Outcome: Progressing     Problem: Safety  Goal: Patient will be injury free during hospitalization  Outcome: Progressing  Goal: I will remain free of falls  Outcome: Progressing     Problem: Daily Care  Goal: Daily care needs are met  Outcome: Progressing     Problem: Psychosocial Needs  Goal: Demonstrates ability to cope with hospitalization/illness  Outcome: Progressing  Goal: Collaborate with me, my family, and caregiver to identify my specific goals  Outcome: Progressing  Flowsheets (Taken 12/22/2023 1421)  Cultural Requests During Hospitalization: n/a  Spiritual Requests During Hospitalization: n/a     Problem: Discharge Barriers  Goal: My discharge needs are met  Outcome: Progressing     Problem: Skin  Goal: Decreased wound size/increased tissue granulation at next dressing change  Outcome: Progressing  Flowsheets (Taken 12/22/2023 1600)  Decreased wound size/increased tissue granulation at next dressing change: Promote sleep for wound healing  Goal: Participates in plan/prevention/treatment measures  Outcome: Progressing  Flowsheets (Taken 12/22/2023 1600)  Participates in plan/prevention/treatment measures: Discuss with provider PT/OT consult  Goal: Prevent/manage excess moisture  Outcome: Progressing  Flowsheets (Taken 12/22/2023 1600)  Prevent/manage excess moisture: Cleanse incontinence/protect with barrier cream  Goal: Prevent/minimize sheer/friction injuries  Outcome: Progressing  Flowsheets (Taken 12/22/2023 1600)  Prevent/minimize sheer/friction injuries: Complete micro-shifts as needed if patient unable. Adjust patient position to relieve pressure points, not a full turn  Goal: Promote/optimize nutrition  Outcome: Progressing  Flowsheets (Taken 12/22/2023 1600)  Promote/optimize nutrition: Assist with feeding  Goal: Promote skin healing  Outcome: Progressing  Flowsheets (Taken 12/22/2023 1600)  Promote skin healing: Assess skin/pad under  line(s)/device(s)   The patient's goals for the shift include      The clinical goals for the shift include safety    Over the shift, the patient did not make progress toward the following goals. Barriers to progression include . Recommendations to address these barriers include .

## 2023-12-22 NOTE — ED NOTES
Patient is extremely agitated and unable to stay calm, patient constantly picks at scabs from rash and attempts to get out of bed and remove oxygen. Physician notified and orders in place     Marlena Lieberman LPN  12/22/23 0657

## 2023-12-23 LAB
ANION GAP SERPL CALC-SCNC: 12 MMOL/L
BUN SERPL-MCNC: 20 MG/DL (ref 8–25)
CALCIUM SERPL-MCNC: 8.3 MG/DL (ref 8.5–10.4)
CHLORIDE SERPL-SCNC: 103 MMOL/L (ref 97–107)
CO2 SERPL-SCNC: 25 MMOL/L (ref 24–31)
CREAT SERPL-MCNC: 1.2 MG/DL (ref 0.4–1.6)
ERYTHROCYTE [DISTWIDTH] IN BLOOD BY AUTOMATED COUNT: 17.7 % (ref 11.5–14.5)
GFR SERPL CREATININE-BSD FRML MDRD: 73 ML/MIN/1.73M*2
GLUCOSE SERPL-MCNC: 168 MG/DL (ref 65–99)
HCT VFR BLD AUTO: 44.1 % (ref 41–52)
HGB BLD-MCNC: 13.2 G/DL (ref 13.5–17.5)
MCH RBC QN AUTO: 25.7 PG (ref 26–34)
MCHC RBC AUTO-ENTMCNC: 29.9 G/DL (ref 32–36)
MCV RBC AUTO: 86 FL (ref 80–100)
NRBC BLD-RTO: 0 /100 WBCS (ref 0–0)
PLATELET # BLD AUTO: 259 X10*3/UL (ref 150–450)
POTASSIUM SERPL-SCNC: 3.5 MMOL/L (ref 3.4–5.1)
RBC # BLD AUTO: 5.13 X10*6/UL (ref 4.5–5.9)
SODIUM SERPL-SCNC: 140 MMOL/L (ref 133–145)
TSH SERPL DL<=0.05 MIU/L-ACNC: 1.4 MIU/L (ref 0.27–4.2)
WBC # BLD AUTO: 6.9 X10*3/UL (ref 4.4–11.3)

## 2023-12-23 PROCEDURE — 2500000004 HC RX 250 GENERAL PHARMACY W/ HCPCS (ALT 636 FOR OP/ED): Performed by: INTERNAL MEDICINE

## 2023-12-23 PROCEDURE — G0378 HOSPITAL OBSERVATION PER HR: HCPCS

## 2023-12-23 PROCEDURE — 80048 BASIC METABOLIC PNL TOTAL CA: CPT | Performed by: INTERNAL MEDICINE

## 2023-12-23 PROCEDURE — 2500000004 HC RX 250 GENERAL PHARMACY W/ HCPCS (ALT 636 FOR OP/ED): Performed by: NURSE PRACTITIONER

## 2023-12-23 PROCEDURE — 99222 1ST HOSP IP/OBS MODERATE 55: CPT | Performed by: NURSE PRACTITIONER

## 2023-12-23 PROCEDURE — 2500000004 HC RX 250 GENERAL PHARMACY W/ HCPCS (ALT 636 FOR OP/ED)

## 2023-12-23 PROCEDURE — 85027 COMPLETE CBC AUTOMATED: CPT | Performed by: INTERNAL MEDICINE

## 2023-12-23 PROCEDURE — 2500000001 HC RX 250 WO HCPCS SELF ADMINISTERED DRUGS (ALT 637 FOR MEDICARE OP): Performed by: INTERNAL MEDICINE

## 2023-12-23 PROCEDURE — 36415 COLL VENOUS BLD VENIPUNCTURE: CPT | Performed by: INTERNAL MEDICINE

## 2023-12-23 PROCEDURE — 84443 ASSAY THYROID STIM HORMONE: CPT | Performed by: INTERNAL MEDICINE

## 2023-12-23 PROCEDURE — 2500000002 HC RX 250 W HCPCS SELF ADMINISTERED DRUGS (ALT 637 FOR MEDICARE OP, ALT 636 FOR OP/ED): Performed by: INTERNAL MEDICINE

## 2023-12-23 RX ORDER — FUROSEMIDE 10 MG/ML
40 INJECTION INTRAMUSCULAR; INTRAVENOUS EVERY 12 HOURS
Status: DISCONTINUED | OUTPATIENT
Start: 2023-12-23 | End: 2023-12-25 | Stop reason: HOSPADM

## 2023-12-23 RX ORDER — POTASSIUM CHLORIDE 20 MEQ/1
40 TABLET, EXTENDED RELEASE ORAL ONCE
Status: COMPLETED | OUTPATIENT
Start: 2023-12-23 | End: 2023-12-23

## 2023-12-23 RX ORDER — FUROSEMIDE 10 MG/ML
40 INJECTION INTRAMUSCULAR; INTRAVENOUS EVERY 12 HOURS
Status: DISCONTINUED | OUTPATIENT
Start: 2023-12-23 | End: 2023-12-23

## 2023-12-23 RX ADMIN — ATORVASTATIN CALCIUM 40 MG: 40 TABLET, FILM COATED ORAL at 21:51

## 2023-12-23 RX ADMIN — DAPAGLIFLOZIN 10 MG: 10 TABLET, FILM COATED ORAL at 10:15

## 2023-12-23 RX ADMIN — FUROSEMIDE 40 MG: 10 INJECTION, SOLUTION INTRAMUSCULAR; INTRAVENOUS at 06:38

## 2023-12-23 RX ADMIN — OXYCODONE AND ACETAMINOPHEN 1 TABLET: 5; 325 TABLET ORAL at 01:11

## 2023-12-23 RX ADMIN — METFORMIN HYDROCHLORIDE 500 MG: 500 TABLET, FILM COATED ORAL at 16:35

## 2023-12-23 RX ADMIN — SUCRALFATE 1 G: 1 TABLET ORAL at 21:51

## 2023-12-23 RX ADMIN — SACUBITRIL AND VALSARTAN 1 TABLET: 24; 26 TABLET, FILM COATED ORAL at 21:51

## 2023-12-23 RX ADMIN — FUROSEMIDE 40 MG: 10 INJECTION, SOLUTION INTRAMUSCULAR; INTRAVENOUS at 18:12

## 2023-12-23 RX ADMIN — ASPIRIN 81 MG: 81 TABLET, COATED ORAL at 10:16

## 2023-12-23 RX ADMIN — SPIRONOLACTONE 25 MG: 25 TABLET ORAL at 10:16

## 2023-12-23 RX ADMIN — DICYCLOMINE HYDROCHLORIDE 10 MG: 10 CAPSULE ORAL at 10:15

## 2023-12-23 RX ADMIN — THIAMINE HCL TAB 100 MG 100 MG: 100 TAB at 10:17

## 2023-12-23 RX ADMIN — PANTOPRAZOLE SODIUM 40 MG: 40 TABLET, DELAYED RELEASE ORAL at 06:07

## 2023-12-23 RX ADMIN — POTASSIUM CHLORIDE 40 MEQ: 1500 TABLET, EXTENDED RELEASE ORAL at 10:21

## 2023-12-23 RX ADMIN — PSYLLIUM HUSK 1 PACKET: 3.4 POWDER ORAL at 10:24

## 2023-12-23 RX ADMIN — CARVEDILOL 3.12 MG: 3.12 TABLET, FILM COATED ORAL at 16:35

## 2023-12-23 RX ADMIN — METFORMIN HYDROCHLORIDE 500 MG: 500 TABLET, FILM COATED ORAL at 10:17

## 2023-12-23 RX ADMIN — CARVEDILOL 3.12 MG: 3.12 TABLET, FILM COATED ORAL at 10:16

## 2023-12-23 RX ADMIN — SACUBITRIL AND VALSARTAN 1 TABLET: 24; 26 TABLET, FILM COATED ORAL at 10:15

## 2023-12-23 RX ADMIN — SUCRALFATE 1 G: 1 TABLET ORAL at 10:16

## 2023-12-23 RX ADMIN — RIVAROXABAN 20 MG: 20 TABLET, FILM COATED ORAL at 16:35

## 2023-12-23 ASSESSMENT — COGNITIVE AND FUNCTIONAL STATUS - GENERAL
HELP NEEDED FOR BATHING: A LITTLE
HELP NEEDED FOR BATHING: A LITTLE
DAILY ACTIVITIY SCORE: 23
MOBILITY SCORE: 24
MOBILITY SCORE: 24
HELP NEEDED FOR BATHING: A LITTLE
MOBILITY SCORE: 24
DAILY ACTIVITIY SCORE: 23
DAILY ACTIVITIY SCORE: 23

## 2023-12-23 ASSESSMENT — PAIN - FUNCTIONAL ASSESSMENT
PAIN_FUNCTIONAL_ASSESSMENT: 0-10

## 2023-12-23 ASSESSMENT — PAIN DESCRIPTION - ORIENTATION: ORIENTATION: LEFT

## 2023-12-23 ASSESSMENT — PAIN SCALES - GENERAL
PAINLEVEL_OUTOF10: 0 - NO PAIN
PAINLEVEL_OUTOF10: 4
PAINLEVEL_OUTOF10: 0 - NO PAIN
PAINLEVEL_OUTOF10: 0 - NO PAIN

## 2023-12-23 ASSESSMENT — PAIN DESCRIPTION - LOCATION: LOCATION: ABDOMEN

## 2023-12-23 NOTE — PROGRESS NOTES
"Emmanuel Luque is a 53 y.o. male on day 0 of admission presenting with Acute on chronic heart failure, unspecified heart failure type (CMS/HCC).    Subjective   Up in bed, denies any CP or dizziness. Still with SOB       Objective     Physical Exam  Eyes:      Pupils: Pupils are equal, round, and reactive to light.   Cardiovascular:      Rate and Rhythm: Normal rate.      Pulses: Normal pulses.   Pulmonary:      Effort: Pulmonary effort is normal.   Musculoskeletal:         General: Swelling present.   Skin:     General: Skin is warm.   Neurological:      Mental Status: He is alert and oriented to person, place, and time.         Last Recorded Vitals  Blood pressure 122/56, pulse 95, temperature 36.7 °C (98.1 °F), temperature source Oral, resp. rate 17, height 1.753 m (5' 9\"), weight 113 kg (249 lb 1.9 oz), SpO2 100 %.  Intake/Output last 3 Shifts:  I/O last 3 completed shifts:  In: 940 (8.3 mL/kg) [P.O.:940]  Out: 5125 (45.4 mL/kg) [Urine:5125 (1.3 mL/kg/hr)]  Weight: 113 kg     Relevant Results                             Assessment/Plan     Acute on chronic heart failure, unspecified heart failure type (CMS/HCC)  Hypertension  Coronary artery disease  - cardio following  - Cont IV lasix   - entresto     Atrial fibrillation- xarelto       DM type II- cont home meds and monitor            I have seen the patient and verified the exam. I have personally reviewed all available data.  I agree with the note as documented with additional comments if needed. The assessment and plan as outlined are reflection of our discussion.    Javed Contreras MD                     "

## 2023-12-23 NOTE — NURSING NOTE
Pt more awake now, ambulates to bathroom, continues IV lasix for BLE edema, needs met, update will be given to incoming RN, bed lowest position, call light within reach.

## 2023-12-23 NOTE — NURSING NOTE
Patient alert and oriented x 3. Patient is in bed, resting. Patient assessed at this time. Patient has diminished lung sounds upon auscultation. Patient has dyspnea on exertion. Patient is on room air. Patient is on TELE. Patients abdomen is round, distended, with  tenderness with active bowel sounds. Patients skin is edemas. Patient has what appears to be scabs to bilateral arms, legs and rash to chest. Patient denies any pain at this time. Patient does not appear to be in any distress. Fall precautions reviewed and implemented. Bed brakes locked, bed in lowest position, call light within reach, bed alarm activated. Will continue to monitor patient to ensure patient safety.

## 2023-12-23 NOTE — CONSULTS
Inpatient consult to cardiology  Consult performed by: KAYLEE Doran  Consult ordered by: KAYLEE Doran  Reason for consult: Congestive heart failure        History Of Present Illness:    Emmanuel Luque is a 53 y.o. male presenting with dyspnea, rash.  Current with Dr. Davis.  Past medical history of systolic heart failure, hypertension, paroxysmal atrial relation, and tobacco use.  Patient states approximately 1 week of worsening shortness of breath.  States he ran out of his furosemide in the outpatient setting approximately 5 days ago.  To that he had been taking extra dosages given his increasing weight.  He does weigh his self daily.  Reports that at home he typically weighs 220 pounds and over the past few weeks his weight has increased to approximately 260 pounds.  He states significant peripheral edema as well as abdominal distention which is a new finding for him.  He notes that he has a  and is up on his feet 5 to 8 hours daily.  Additionally he reports some depression as his brother has passed away and he is alone in his home.  Furthermore he reports what he believes to be psoriasis on his bilateral arms which was minimal for 1 year ago and has significantly progressed over the past year to multiple lesions which cover both of his upper extremities and is now extending into his back.  In the emergency department EKG a rate controlled atrial fibrillation with left axis deviation and T wave inversion in leads V5 V6.  proBNP 1765.  Creatinine 1.2.  Potassium 3.5.  Sodium 140.  Glucose 168.  Most recent echocardiogram from August of this year with ejection fraction of 20 to 25% with global hypokinesis with severely dilated left atrium and mild mitral and tricuspid regurgitation.  Patient received IV furosemide in the emergency department and was admitted on telemetry for further testing and treatment.  Note patient did not denies complaints of chest pain or pressure,  palpitations or feeling of rapid heart rate.     Last Recorded Vitals:  Vitals:    12/22/23 0851 12/22/23 1515 12/22/23 1552 12/23/23 0056   BP: (!) 139/106 (!) 157/116 (!) 155/115 123/56   BP Location:   Right arm Right arm   Patient Position:   Lying Sitting   Pulse:  107 98 89   Resp: 19  16 19   Temp:   36.5 °C (97.7 °F) 36.8 °C (98.2 °F)   TempSrc:   Oral Oral   SpO2: 95%  100% 96%   Weight:       Height:           Last Labs:  Results for orders placed or performed during the hospital encounter of 12/22/23 (from the past 24 hour(s))   Drug Screen, Urine   Result Value Ref Range    Amphetamine Screen, Urine Negative      Barbiturate Screen, Urine Negative      Benzodiazepines Screen, Urine Negative      Cannabinoid Screen, Urine Negative      Cocaine Metabolite Screen, Urine Positive (A)      Fentanyl Screen, Urine Negative       Methadone Screen, Urine Negative      Opiate Screen, Urine Negative      Oxycodone Screen, Urine Negative      PCP Screen, Urine Negative     Urinalysis with Reflex Culture and Microscopic   Result Value Ref Range    Color, Urine Yellow Light-Yellow, Yellow, Dark-Yellow    Appearance, Urine Clear Clear    Specific Gravity, Urine 1.029 1.005 - 1.035    pH, Urine 5.5 5.0, 5.5, 6.0, 6.5, 7.0, 7.5, 8.0    Protein, Urine 200 (2+) (A) NEGATIVE, 10 (TRACE), 20 (TRACE) mg/dL    Glucose, Urine Normal Normal mg/dL    Blood, Urine NEGATIVE NEGATIVE    Ketones, Urine NEGATIVE NEGATIVE mg/dL    Bilirubin, Urine 0.5 (1+) (A) NEGATIVE    Urobilinogen, Urine 3 (1+) (A) Normal mg/dL    Nitrite, Urine NEGATIVE NEGATIVE    Leukocyte Esterase, Urine NEGATIVE NEGATIVE   Extra Urine Gray Tube   Result Value Ref Range    Extra Tube Hold for add-ons.    Urinalysis Microscopic   Result Value Ref Range    WBC, Urine 1-5 1-5, NONE /HPF    RBC, Urine 1-2 NONE, 1-2, 3-5 /HPF    Mucus, Urine 1+ Reference range not established. /LPF   Serial Troponin, 2 Hour ([facility])   Result Value Ref Range    Troponin T, High  Sensitivity 25 (H) <=15 ng/L   Serial Troponin, 6 Hour ([facility])   Result Value Ref Range    Troponin T, High Sensitivity 23 (H) <=15 ng/L   Magnesium   Result Value Ref Range    Magnesium 2.20 1.60 - 3.10 mg/dL   CBC   Result Value Ref Range    WBC 6.9 4.4 - 11.3 x10*3/uL    nRBC 0.0 0.0 - 0.0 /100 WBCs    RBC 5.13 4.50 - 5.90 x10*6/uL    Hemoglobin 13.2 (L) 13.5 - 17.5 g/dL    Hematocrit 44.1 41.0 - 52.0 %    MCV 86 80 - 100 fL    MCH 25.7 (L) 26.0 - 34.0 pg    MCHC 29.9 (L) 32.0 - 36.0 g/dL    RDW 17.7 (H) 11.5 - 14.5 %    Platelets 259 150 - 450 x10*3/uL   Basic metabolic panel   Result Value Ref Range    Glucose 168 (H) 65 - 99 mg/dL    Sodium 140 133 - 145 mmol/L    Potassium 3.5 3.4 - 5.1 mmol/L    Chloride 103 97 - 107 mmol/L    Bicarbonate 25 24 - 31 mmol/L    Urea Nitrogen 20 8 - 25 mg/dL    Creatinine 1.20 0.40 - 1.60 mg/dL    eGFR 73 >60 mL/min/1.73m*2    Calcium 8.3 (L) 8.5 - 10.4 mg/dL    Anion Gap 12 <=19 mmol/L   TSH   Result Value Ref Range    Thyroid Stimulating Hormone 1.40 0.27 - 4.20 mIU/L       Last I/O:  I/O last 3 completed shifts:  In: 940 (8.3 mL/kg) [P.O.:940]  Out: 5125 (45.4 mL/kg) [Urine:5125 (1.3 mL/kg/hr)]  Weight: 113 kg     Past Cardiology Tests (Last 3 Years):  EKG: Rate controlled atrial fibrillation with left axis deviation and T wave inversion in V5 and V6    Echo:  8/2023, 2025%, global hypokinesis, severely dilated left atria, mild mitral and tricuspid regurgitation       Past Medical History:  He has a past medical history of CHF (congestive heart failure) (CMS/HCC) and Hypertension.    Past Surgical History:  He has a past surgical history that includes Cardiac surgery.      Social History:  He reports that he has been smoking cigarettes. He has a 1.00 pack-year smoking history. He has never used smokeless tobacco. He reports current alcohol use of about 3.0 standard drinks of alcohol per week. He reports current drug use. Drug: Other.    Family History:  No family history on  file.     Allergies:  Patient has no known allergies.    Inpatient Medications:  Scheduled medications   Medication Dose Route Frequency    aspirin  81 mg oral Daily    atorvastatin  40 mg oral Nightly    carvedilol  3.125 mg oral BID with meals    dapagliflozin propanediol  10 mg oral Daily    dicyclomine  10 mg oral Daily    furosemide  40 mg intravenous q12h    metFORMIN  500 mg oral BID with meals    pantoprazole  40 mg oral Daily before breakfast    potassium chloride CR  20 mEq oral Daily    psyllium  1 packet oral Daily    rivaroxaban  20 mg oral Daily with evening meal    sacubitriL-valsartan  1 tablet oral BID    spironolactone  25 mg oral Daily    sucralfate  1 g oral q12h NYA    thiamine  100 mg oral Daily     PRN medications   Medication    acetaminophen    Or    acetaminophen    Or    acetaminophen    magnesium sulfate    magnesium sulfate    oxyCODONE-acetaminophen    oxygen     Continuous Medications   Medication Dose Last Rate     Outpatient Medications:  Current Outpatient Medications   Medication Instructions    aspirin 81 mg EC tablet 1 tablet, oral, Daily    atorvastatin (Lipitor) 40 mg tablet 1 tablet, oral, Daily    carvedilol (Coreg) 3.125 mg tablet 1 tablet, oral, 2 times daily with meals    ciprofloxacin (Cipro) 500 mg tablet 1 tablet, oral, Every 12 hours    dapagliflozin propanediol (Farxiga) 10 mg 1 tablet, oral, Daily    dicyclomine (Bentyl) 10 mg capsule 1 capsule, oral, Daily    furosemide (Lasix) 40 mg tablet 1 tablet, oral, Daily    lisinopril 5 mg tablet 1 tablet, oral, Daily    metFORMIN (Glucophage) 500 mg tablet 1 tablet, oral, 2 times daily    metoprolol succinate XL (Toprol-XL) 50 mg 24 hr tablet 1 tablet, oral, Daily    metoprolol tartrate (Lopressor) 100 mg tablet 1 tablet, oral, Every 12 hours    oxyCODONE-acetaminophen (Percocet) 5-325 mg tablet 1 tablet, oral, As needed    pantoprazole (ProtoNix) 40 mg EC tablet 1 tablet, oral, Daily,  for 1 month post ablation      rivaroxaban (Xarelto) 20 mg tablet 1 tablet, oral, Daily with evening meal    sacubitriL-valsartan (Entresto) 24-26 mg tablet 1 tablet, oral, 2 times daily    spironolactone (Aldactone) 25 mg tablet 1 tablet, oral, Daily    sucralfate (Carafate) 1 gram tablet 1 tablet, oral, Every 12 hours    thiamine 100 mg tablet 1 tablet, oral, Daily       Physical Exam:  General: alert, oriented x 3, very pleasant  HEENT: normal cephalic, atraumatic  Neck: No JVD, bruit or thrill, masses or tenderness   Heart: S1/S2, Rate 70 Rhythm irregular, no s3 or s4, no murmur, thrill, or heaves at PMI.   Lungs: Significant wheezing noted to the right upper and lower lobes, coarse crackles to right upper and lower lobe significant diminished breath sounds left base and only clear in left upper lobe, room air.  Conversational dyspnea is appreciated.  No pain with deep inspiration  Abdomen: Obese, abdomen is firm and edematous  Genitourinary: deferred   Extremities: Lower extremities have a +2 bilateral lower extremity pitting edema they are red and warm.  Upper extremities with significant lesions, dry and flaky; suspected plaque psoriasis       Assessment/Plan     Acute on chronic systolic heart failure  Hypertensive disorder  Persistent atrial fibrillation  Rash  Suspected plaque psoriasis  Obesity  Hyperlipidemia    Overall impression:    12/23: Assessed by [facility] in collaboration with Dr. Olivas on coverage for Dr. Davis.  Patient presents with significant shortness of breath.  As described above.  Worsening course over the past week.  Ran out of his furosemide in the outpatient setting 5 days ago.  Prior to this had been taking extra dosages.  Reports approximately 40 pound weight gain over the past month.  Reports mild rash to his upper extremities which began a year ago, now they are very significant with plaques covering his bilateral upper extremities with a rash now to his back as well as to his lower  extremities.  There is suspicion for a plaque psoriasis.  He has a known significantly reduced ejection fraction of 20 to 25%.  States he is generally compliant with medications in the outpatient setting.  Does continue to smoke.  X-ray did reveal fluid volume overload.  He is being diuresed.  Does feel better today compared to admission.  Remains with significant crackles to the right side we will continue with IV diuresis.  He is on guideline directed therapy using Entresto as well as Farxiga and beta-blocker.  He continues in a rate controlled atrial fibrillation.  He is on Xarelto for stroke risk reduction.  His blood pressure stable with most recent 123/56.  Overall this patient is significant decompensated with a complicated heart history.  He appears to be optimized at this time on guideline directed therapy, he arguello require further diuresis.  Will continue on IV furosemide.  Patient has requested a dermatology appointment, I have placed a recommendation for follow-up.  Potassium is low normal at 3.5 today, given we will give him further diuresis will give him a one-time dose of 40 mill equivalents today.  Most recent echocardiogram is from 8/2023.  No plan to repeat at this time.  Will follow with you.    Code Status:  Full Code    I spent 60 minutes in the professional and overall care of this patient.        Feliciano Butts, APRN-CNP

## 2023-12-23 NOTE — NURSING NOTE
Assumed care of pt, bedside nurse shift report received, pt asleep, in no distress, rashes bilateral upper extremity, chest area, bed lowest position, alarm activated, call light within reach.

## 2023-12-23 NOTE — NURSING NOTE
This nurse notified pharmacy [facility] that patient had Lasix at 0638. Pharmacist stated will retime Lasix with new order and to give the one time dose of potassium 40 mEq do not give 20 mEq daily.

## 2023-12-23 NOTE — H&P
History Of Present Illness  Emmanuel Luque is a 53 y.o. male presenting with body swelling and  Shortness of breath..  His symptoms started about a week ago and gradually got worse.  Patient dyspnea exertion, PND and orthopnea.  Patient said that he does not know his medications and his symptoms continue to get worse.  Emergency room initial workup was done and patient found to have acute congestive heart failure.  Patient has been admitted to the floor for further management.  Patient denied any nausea or vomiting.  No diarrhea, dysuria, hematuria frequency.  No hematemesis, hematochezia or melena.  No odynophagia or dysphagia.    Past Medical History  Past Medical History:   Diagnosis Date    CHF (congestive heart failure) (CMS/HCC)     Hypertension        Surgical History  Past Surgical History:   Procedure Laterality Date    CARDIAC SURGERY          Social History  He reports that he has been smoking cigarettes. He has a 1.00 pack-year smoking history. He has never used smokeless tobacco. He reports current alcohol use of about 3.0 standard drinks of alcohol per week. He reports current drug use. Drug: Other.    Family History  No family history on file.     Allergies  Patient has no known allergies.    Review of Systems  I reviewed all systems reviewed as above otherwise is negative.  Physical Exam  HEENT:  Head externally atraumatic, no pallor, no icterus, extraocular movements intact, pupils reactive to light, oral mucosa moist and throat clear.  Neck: Small, Ty, supple, JVP elevated,, no palpable adenopathy or thyromegaly.  No carotid bruit.  Chest:  Clear to auscultation and resonant.  Heart:  Regular rate and rhythm, no murmur or gallop could be appreciated.  Abdomen:  Soft, nontender, obese, bowel sounds present, normoactive, no palpable hepatosplenomegaly.  Extremities:  No edema, pulses present, no cyanosis or clubbing.  CNS:  Patient alert, oriented to time, place and person.  Power 5/5 all over and  deep tendon reflexes symmetrical, cranial nerves 2-12 grossly intact.  Skin:  No active rash.  Musculoskeletal:  No joint swelling or erythema, range of movement normal.  Last Recorded Vitals  Heart Rate:  []   Temp:  [36.5 °C (97.7 °F)-36.8 °C (98.2 °F)]   Resp:  [16-19]   BP: (122-157)/()   SpO2:  [96 %-100 %]       Relevant Results        Results for orders placed or performed during the hospital encounter of 12/22/23 (from the past 24 hour(s))   Serial Troponin, 6 Hour ([facility])   Result Value Ref Range    Troponin T, High Sensitivity 23 (H) <=15 ng/L   Magnesium   Result Value Ref Range    Magnesium 2.20 1.60 - 3.10 mg/dL   CBC   Result Value Ref Range    WBC 6.9 4.4 - 11.3 x10*3/uL    nRBC 0.0 0.0 - 0.0 /100 WBCs    RBC 5.13 4.50 - 5.90 x10*6/uL    Hemoglobin 13.2 (L) 13.5 - 17.5 g/dL    Hematocrit 44.1 41.0 - 52.0 %    MCV 86 80 - 100 fL    MCH 25.7 (L) 26.0 - 34.0 pg    MCHC 29.9 (L) 32.0 - 36.0 g/dL    RDW 17.7 (H) 11.5 - 14.5 %    Platelets 259 150 - 450 x10*3/uL   Basic metabolic panel   Result Value Ref Range    Glucose 168 (H) 65 - 99 mg/dL    Sodium 140 133 - 145 mmol/L    Potassium 3.5 3.4 - 5.1 mmol/L    Chloride 103 97 - 107 mmol/L    Bicarbonate 25 24 - 31 mmol/L    Urea Nitrogen 20 8 - 25 mg/dL    Creatinine 1.20 0.40 - 1.60 mg/dL    eGFR 73 >60 mL/min/1.73m*2    Calcium 8.3 (L) 8.5 - 10.4 mg/dL    Anion Gap 12 <=19 mmol/L   TSH   Result Value Ref Range    Thyroid Stimulating Hormone 1.40 0.27 - 4.20 mIU/L     Prior to Admission medications    Medication Sig Start Date End Date Taking? Authorizing Provider   aspirin 81 mg EC tablet Take 1 tablet (81 mg) by mouth once daily. 10/7/21  Yes Historical Provider, MD   atorvastatin (Lipitor) 40 mg tablet Take 1 tablet (40 mg) by mouth once daily. 10/3/21  Yes Historical Provider, MD   carvedilol (Coreg) 3.125 mg tablet Take 1 tablet (3.125 mg) by mouth 2 times a day with meals. 4/9/13  Yes Historical Provider, MD   ciprofloxacin (Cipro) 500 mg  tablet Take 1 tablet (500 mg) by mouth every 12 hours.   Yes Historical Provider, MD   dapagliflozin propanediol (Farxiga) 10 mg Take 1 tablet (10 mg) by mouth once daily. 10/7/21  Yes Historical Provider, MD   dicyclomine (Bentyl) 10 mg capsule Take 1 capsule (10 mg) by mouth once daily. 11/5/21  Yes Historical Provider, MD   furosemide (Lasix) 40 mg tablet Take 1 tablet (40 mg) by mouth once daily. 8/22/22  Yes Historical Provider, MD   lisinopril 5 mg tablet Take 1 tablet (5 mg) by mouth once daily. 4/9/13  Yes Historical Provider, MD   metFORMIN (Glucophage) 500 mg tablet Take 1 tablet (500 mg) by mouth twice a day. 10/3/21  Yes Historical Provider, MD   metoprolol succinate XL (Toprol-XL) 50 mg 24 hr tablet Take 1 tablet (50 mg) by mouth once daily. 12/1/22  Yes Historical Provider, MD   metoprolol tartrate (Lopressor) 100 mg tablet Take 1 tablet (100 mg) by mouth every 12 hours. 10/3/21  Yes Historical Provider, MD   oxyCODONE-acetaminophen (Percocet) 5-325 mg tablet Take 1 tablet by mouth if needed.   Yes Historical Provider, MD   pantoprazole (ProtoNix) 40 mg EC tablet Take 1 tablet (40 mg) by mouth once daily.  for 1 month post ablation 12/1/22  Yes Historical Provider, MD   rivaroxaban (Xarelto) 20 mg tablet Take 1 tablet (20 mg) by mouth once daily in the evening. Take with meals. 10/3/21  Yes Historical Provider, MD   sacubitriL-valsartan (Entresto) 24-26 mg tablet Take 1 tablet by mouth twice a day. 12/2/22  Yes Historical Provider, MD   spironolactone (Aldactone) 25 mg tablet Take 1 tablet (25 mg) by mouth once daily. 10/3/21  Yes Historical Provider, MD   sucralfate (Carafate) 1 gram tablet Take 1 tablet (1 g) by mouth every 12 hours. 11/8/21  Yes Historical Provider, MD   thiamine 100 mg tablet Take 1 tablet (100 mg) by mouth once daily. 11/5/21  Yes Historical Provider, MD       Current Facility-Administered Medications:     acetaminophen (Tylenol) tablet 650 mg, 650 mg, oral, q6h PRN **OR**  acetaminophen (Tylenol) oral liquid 650 mg, 650 mg, nasogastric tube, q6h PRN **OR** acetaminophen (Tylenol) suppository 650 mg, 650 mg, rectal, q6h PRN, Deuce Miramontes MD    aspirin EC tablet 81 mg, 81 mg, oral, Daily, Deuce Miramontes MD, 81 mg at 12/22/23 1618    atorvastatin (Lipitor) tablet 40 mg, 40 mg, oral, Nightly, Deuce Miramontes MD, 40 mg at 12/22/23 2142    carvedilol (Coreg) tablet 3.125 mg, 3.125 mg, oral, BID with meals, Deuce Miramontes MD, 3.125 mg at 12/22/23 1618    dapagliflozin propanediol (Farxiga) tablet 10 mg, 10 mg, oral, Daily, Deuce Miramontes MD, 10 mg at 12/22/23 1702    dicyclomine (Bentyl) capsule 10 mg, 10 mg, oral, Daily, Deuce Miramontes MD, 10 mg at 12/22/23 1618    furosemide (Lasix) injection 40 mg, 40 mg, intravenous, q12h, Feliciano Butts, APRN-CNP    magnesium sulfate IV 2 g, 2 g, intravenous, Once PRN, Rory Irwin, [facility]    magnesium sulfate IV 4 g, 4 g, intravenous, Once PRN, Rory Irwin, [facility]    metFORMIN (Glucophage) tablet 500 mg, 500 mg, oral, BID with meals, Deuce Miramontes MD, 500 mg at 12/22/23 1618    oxyCODONE-acetaminophen (Percocet) 5-325 mg per tablet 1 tablet, 1 tablet, oral, Daily PRN, Deuce Miramontes MD, 1 tablet at 12/23/23 0111    oxygen (O2) therapy, , inhalation, Continuous PRN - O2/gases, Deuce Miramontes MD    pantoprazole (ProtoNix) EC tablet 40 mg, 40 mg, oral, Daily before breakfast, Deuce Miramontes MD, 40 mg at 12/23/23 0607    potassium chloride CR (Klor-Con M20) ER tablet 20 mEq, 20 mEq, oral, Daily, Deuce Miramontes MD, 20 mEq at 12/22/23 1618    potassium chloride CR (Klor-Con M20) ER tablet 40 mEq, 40 mEq, oral, Once, Feliciano Butts APRN-CNP    psyllium (Metamucil) 3.4 gram packet 1 packet, 1 packet, oral, Daily, Deuce Miramontes MD    rivaroxaban (Xarelto) tablet 20 mg, 20 mg, oral, Daily with evening meal, Rory Irwin [facility], 20 mg at 12/22/23 1618    sacubitriL-valsartan  (Entresto) 24-26 mg per tablet 1 tablet, 1 tablet, oral, BID, Deuce Miramontes MD, 1 tablet at 12/22/23 2233    spironolactone (Aldactone) tablet 25 mg, 25 mg, oral, Daily, Deuce Miramontes MD, 25 mg at 12/22/23 1618    sucralfate (Carafate) tablet 1 g, 1 g, oral, q12h NYA, Deuce Miramontes MD, 1 g at 12/22/23 2142    thiamine (Vitamin B-1) tablet 100 mg, 100 mg, oral, Daily, Deuce Miramontes MD, 100 mg at 12/22/23 1618  ECG 12 lead    Result Date: 12/22/2023  Atrial fibrillation with rapid ventricular response with premature ventricular or aberrantly conducted complexes Left anterior fascicular block Minimal voltage criteria for LVH, may be normal variant ( [place] product ) T wave abnormality, consider lateral ischemia Abnormal ECG When compared with ECG of 07-DEC-2022 11:30, Left anterior fascicular block is now Present ST no longer depressed in Anterior leads T wave inversion no longer evident in Anterior leads Confirmed by Lefty Resendiz ([id number]) on 12/22/2023 8:16:53 AM    XR chest 1 view    Result Date: 12/22/2023  Interpreted By:  Reggie Herrera, STUDY: XR CHEST 1 VIEW; 12/22/2023 6:35 am   INDICATION: Signs/Symptoms:sob cough, rash on chest   COMPARISON: Chest radiograph 08/24/2023 and chest CT 08/19/2023   ACCESSION NUMBER(S): [id number]   ORDERING CLINICIAN: JEFFREY VARGAS   TECHNIQUE: AP view of the chest performed.   FINDINGS: Cardiac size is indeterminate due to the AP projection, but the heart appears enlarged. Monitor leads overlie the chest. Redemonstrated left chest wall pacemaker/ICD device. There is prominence of the pulmonary vasculature. Faint right basilar hazy opacity. No focal consolidation, large pleural effusion, or visible pneumothorax. Slightly limited evaluation due to noninclusion of the inferior most aspect of the right lateral costophrenic angle on either image.       Cardiomegaly with prominence of the pulmonary vasculature, suspicious for pulmonary vascular  congestion/interstitial edema. Please correlate for corresponding symptoms.   Faint right basilar hazy opacity may reflect atelectasis versus trace pleural effusion.   MACRO: None.   Signed by: Reggie Herrera 12/22/2023 6:44 AM Dictation workstation:   [id number]    No results found for the last 90 days.       Assessment/Plan   Principal Problem:    Acute on chronic heart failure, unspecified heart failure type (CMS/HCC)  Hypertension  Coronary artery disease  Obesity  Hyperlipidemia  GERD  Atrial fibrillation  History of alcohol abuse    Plan: Continue current medication with insulin..  Give physical therapy and Occupational Therapy.  Monitor CBC and BMP.  Increase insulin.  Patient started on the diuretic.  Monitor input output and electrolytes.  Replete as needed.  Consult cardiology.  Will take DVT, fall, aspiration, decubitus and DVT precautions.  Monitor blood sugar.  This has been discussed with the patient and he is agreeable to it.  For details see the order sheet.        Deuce Miramontes MD

## 2023-12-23 NOTE — NURSING NOTE
Pt has been sleeping, a little bit lethargic, wants to be left alone, continues po Lasix with positive outcome, pt using urinal and going to the bathroom, will monitor.

## 2023-12-24 LAB
ANION GAP SERPL CALC-SCNC: 11 MMOL/L
BUN SERPL-MCNC: 16 MG/DL (ref 8–25)
CALCIUM SERPL-MCNC: 8.8 MG/DL (ref 8.5–10.4)
CHLORIDE SERPL-SCNC: 99 MMOL/L (ref 97–107)
CO2 SERPL-SCNC: 23 MMOL/L (ref 24–31)
CREAT SERPL-MCNC: 1.4 MG/DL (ref 0.4–1.6)
ERYTHROCYTE [DISTWIDTH] IN BLOOD BY AUTOMATED COUNT: 18.4 % (ref 11.5–14.5)
GFR SERPL CREATININE-BSD FRML MDRD: 60 ML/MIN/1.73M*2
GLUCOSE SERPL-MCNC: 176 MG/DL (ref 65–99)
HCT VFR BLD AUTO: 51 % (ref 41–52)
HGB BLD-MCNC: 15.4 G/DL (ref 13.5–17.5)
MCH RBC QN AUTO: 25.9 PG (ref 26–34)
MCHC RBC AUTO-ENTMCNC: 30.2 G/DL (ref 32–36)
MCV RBC AUTO: 86 FL (ref 80–100)
NRBC BLD-RTO: 0 /100 WBCS (ref 0–0)
PLATELET # BLD AUTO: 360 X10*3/UL (ref 150–450)
POTASSIUM SERPL-SCNC: 4.5 MMOL/L (ref 3.4–5.1)
RBC # BLD AUTO: 5.94 X10*6/UL (ref 4.5–5.9)
SODIUM SERPL-SCNC: 133 MMOL/L (ref 133–145)
WBC # BLD AUTO: 7.7 X10*3/UL (ref 4.4–11.3)

## 2023-12-24 PROCEDURE — 2500000004 HC RX 250 GENERAL PHARMACY W/ HCPCS (ALT 636 FOR OP/ED): Performed by: INTERNAL MEDICINE

## 2023-12-24 PROCEDURE — 2500000002 HC RX 250 W HCPCS SELF ADMINISTERED DRUGS (ALT 637 FOR MEDICARE OP, ALT 636 FOR OP/ED): Performed by: INTERNAL MEDICINE

## 2023-12-24 PROCEDURE — 99232 SBSQ HOSP IP/OBS MODERATE 35: CPT | Performed by: NURSE PRACTITIONER

## 2023-12-24 PROCEDURE — 80048 BASIC METABOLIC PNL TOTAL CA: CPT | Performed by: INTERNAL MEDICINE

## 2023-12-24 PROCEDURE — 2500000001 HC RX 250 WO HCPCS SELF ADMINISTERED DRUGS (ALT 637 FOR MEDICARE OP): Performed by: INTERNAL MEDICINE

## 2023-12-24 PROCEDURE — 2500000004 HC RX 250 GENERAL PHARMACY W/ HCPCS (ALT 636 FOR OP/ED)

## 2023-12-24 PROCEDURE — 36415 COLL VENOUS BLD VENIPUNCTURE: CPT | Performed by: INTERNAL MEDICINE

## 2023-12-24 PROCEDURE — G0378 HOSPITAL OBSERVATION PER HR: HCPCS

## 2023-12-24 PROCEDURE — 2500000004 HC RX 250 GENERAL PHARMACY W/ HCPCS (ALT 636 FOR OP/ED): Performed by: NURSE PRACTITIONER

## 2023-12-24 PROCEDURE — 85027 COMPLETE CBC AUTOMATED: CPT | Performed by: INTERNAL MEDICINE

## 2023-12-24 RX ORDER — AMMONIUM LACTATE 12 G/100G
1 LOTION TOPICAL
Status: DISCONTINUED | OUTPATIENT
Start: 2023-12-24 | End: 2023-12-25 | Stop reason: HOSPADM

## 2023-12-24 RX ADMIN — METFORMIN HYDROCHLORIDE 500 MG: 500 TABLET, FILM COATED ORAL at 09:35

## 2023-12-24 RX ADMIN — SUCRALFATE 1 G: 1 TABLET ORAL at 21:27

## 2023-12-24 RX ADMIN — ASPIRIN 81 MG: 81 TABLET, COATED ORAL at 09:35

## 2023-12-24 RX ADMIN — CARVEDILOL 3.12 MG: 3.12 TABLET, FILM COATED ORAL at 16:24

## 2023-12-24 RX ADMIN — PSYLLIUM HUSK 1 PACKET: 3.4 POWDER ORAL at 09:35

## 2023-12-24 RX ADMIN — THIAMINE HCL TAB 100 MG 100 MG: 100 TAB at 09:36

## 2023-12-24 RX ADMIN — SACUBITRIL AND VALSARTAN 1 TABLET: 24; 26 TABLET, FILM COATED ORAL at 09:36

## 2023-12-24 RX ADMIN — SACUBITRIL AND VALSARTAN 1 TABLET: 24; 26 TABLET, FILM COATED ORAL at 21:27

## 2023-12-24 RX ADMIN — FUROSEMIDE 40 MG: 10 INJECTION, SOLUTION INTRAMUSCULAR; INTRAVENOUS at 17:14

## 2023-12-24 RX ADMIN — FUROSEMIDE 40 MG: 10 INJECTION, SOLUTION INTRAMUSCULAR; INTRAVENOUS at 06:58

## 2023-12-24 RX ADMIN — RIVAROXABAN 20 MG: 20 TABLET, FILM COATED ORAL at 16:24

## 2023-12-24 RX ADMIN — SPIRONOLACTONE 25 MG: 25 TABLET ORAL at 09:35

## 2023-12-24 RX ADMIN — DAPAGLIFLOZIN 10 MG: 10 TABLET, FILM COATED ORAL at 09:36

## 2023-12-24 RX ADMIN — CARVEDILOL 3.12 MG: 3.12 TABLET, FILM COATED ORAL at 09:36

## 2023-12-24 RX ADMIN — SUCRALFATE 1 G: 1 TABLET ORAL at 09:35

## 2023-12-24 RX ADMIN — POTASSIUM CHLORIDE 20 MEQ: 1500 TABLET, EXTENDED RELEASE ORAL at 09:35

## 2023-12-24 RX ADMIN — ATORVASTATIN CALCIUM 40 MG: 40 TABLET, FILM COATED ORAL at 21:27

## 2023-12-24 RX ADMIN — PANTOPRAZOLE SODIUM 40 MG: 40 TABLET, DELAYED RELEASE ORAL at 07:00

## 2023-12-24 RX ADMIN — DICYCLOMINE HYDROCHLORIDE 10 MG: 10 CAPSULE ORAL at 09:36

## 2023-12-24 RX ADMIN — METFORMIN HYDROCHLORIDE 500 MG: 500 TABLET, FILM COATED ORAL at 16:24

## 2023-12-24 ASSESSMENT — COGNITIVE AND FUNCTIONAL STATUS - GENERAL
DAILY ACTIVITIY SCORE: 24
MOBILITY SCORE: 24

## 2023-12-24 ASSESSMENT — PAIN SCALES - GENERAL
PAINLEVEL_OUTOF10: 0 - NO PAIN
PAINLEVEL_OUTOF10: 0 - NO PAIN

## 2023-12-24 NOTE — PROGRESS NOTES
INTERNAL MEDICINE  NOTE                                            PATIENT NAME: Emmanuel Luque    MRN: [id number]  SERVICE DATE: 12/24/2023      ASSESSMENT and PLAN:    Acute on chronic heart failure, unspecified heart failure type (CMS/HCC)  Hypertension  Coronary artery disease  - cardio following  - Cont IV lasix   - entresto      Atrial fibrillation- xarelto         DM type II- cont home meds and monitor             Continue current medications.  Supportive care.  Physical therapy and Occupational Therapy -as needed.  Continue to monitor labs. Continue DVT, aspiration, decubitus precautions.         SUBJECTIVE:    Pt is seen and examined. No new issues.     OBJECTIVE:      Current Facility-Administered Medications:     acetaminophen (Tylenol) tablet 650 mg, 650 mg, oral, q6h PRN **OR** acetaminophen (Tylenol) oral liquid 650 mg, 650 mg, nasogastric tube, q6h PRN **OR** acetaminophen (Tylenol) suppository 650 mg, 650 mg, rectal, q6h PRN, Deuce Miramontes MD    ammonium lactate (Lac-Hydrin) 12 % lotion 1 Application, 1 Application, Topical, q1h PRN, ANDREA Doran-CNP    aspirin EC tablet 81 mg, 81 mg, oral, Daily, Deuce Miramontes MD, 81 mg at 12/24/23 0935    atorvastatin (Lipitor) tablet 40 mg, 40 mg, oral, Nightly, Deuce Miramontes MD, 40 mg at 12/23/23 2151    carvedilol (Coreg) tablet 3.125 mg, 3.125 mg, oral, BID with meals, Deuce Miramontes MD, 3.125 mg at 12/24/23 0936    dapagliflozin propanediol (Farxiga) tablet 10 mg, 10 mg, oral, Daily, Deuce Miramontes MD, 10 mg at 12/24/23 0936    dicyclomine (Bentyl) capsule 10 mg, 10 mg, oral, Daily, Deuce Miramontes MD, 10 mg at 12/24/23 0936    furosemide (Lasix) injection 40 mg, 40 mg, intravenous, q12h, ANDREA Doran-CNP, 40 mg at 12/24/23 0658    magnesium sulfate IV 2 g, 2 g, intravenous, Once PRN, Rory Irwin, RPh    magnesium sulfate IV 4 g, 4 g, intravenous, Once PRN, Rory Irwin, RP    metFORMIN  "(Glucophage) tablet 500 mg, 500 mg, oral, BID with meals, Deuce Miramontes MD, 500 mg at 12/24/23 0935    oxyCODONE-acetaminophen (Percocet) 5-325 mg per tablet 1 tablet, 1 tablet, oral, Daily PRN, Deuce Miramontes MD, 1 tablet at 12/23/23 0111    oxygen (O2) therapy, , inhalation, Continuous PRN - O2/gases, Deuce Miramontes MD    pantoprazole (ProtoNix) EC tablet 40 mg, 40 mg, oral, Daily before breakfast, Deuce Miramontes MD, 40 mg at 12/24/23 0700    potassium chloride CR (Klor-Con M20) ER tablet 20 mEq, 20 mEq, oral, Daily, Deuce Miramontes MD, 20 mEq at 12/24/23 0935    psyllium (Metamucil) 3.4 gram packet 1 packet, 1 packet, oral, Daily, Deuce Miramontes MD, 1 packet at 12/24/23 0935    rivaroxaban (Xarelto) tablet 20 mg, 20 mg, oral, Daily with evening meal, Rory Irwin, [facility], 20 mg at 12/23/23 1635    sacubitriL-valsartan (Entresto) 24-26 mg per tablet 1 tablet, 1 tablet, oral, BID, Deuce Miramontes MD, 1 tablet at 12/24/23 0936    spironolactone (Aldactone) tablet 25 mg, 25 mg, oral, Daily, Deuce Miramontes MD, 25 mg at 12/24/23 0935    sucralfate (Carafate) tablet 1 g, 1 g, oral, q12h NYA, Deuce Miramontes MD, 1 g at 12/24/23 0935    thiamine (Vitamin B-1) tablet 100 mg, 100 mg, oral, Daily, Deuce Miramontes MD, 100 mg at 12/24/23 0936        VITAL SIGNS:  /68 (BP Location: Left arm, Patient Position: Sitting)   Pulse 89   Temp 36.6 °C (97.9 °F) (Oral)   Resp 17   Ht 1.753 m (5' 9\")   Wt 113 kg (249 lb 1.9 oz)   SpO2 96%   BMI 36.79 kg/m²         PHYSICAL EXAM:  General: Awake and Alert, in no distress  Head - Normocephalic, atraumatic  Cardiac: S1S2 wnl, No MGR  Lungs: Clear to auscultation, no rales  Abdomen: Soft, non-tender  Extremities: No obvious deformity          DATA:   Diagnostic tests reviewed for today's visit:          No results found for this or any previous visit (from the past 24 hour(s)).        SIGNATURE    Javed Contreras MD  DATE: " December 24, 2023

## 2023-12-24 NOTE — CARE PLAN
The patient's goals for the shift include      The clinical goals for the shift include no falls

## 2023-12-24 NOTE — CARE PLAN
Problem: Pain  Goal: My pain/discomfort is manageable  Outcome: Progressing     Problem: Safety  Goal: Patient will be injury free during hospitalization  Outcome: Progressing  Goal: I will remain free of falls  Outcome: Progressing     Problem: Daily Care  Goal: Daily care needs are met  Outcome: Progressing     Problem: Psychosocial Needs  Goal: Demonstrates ability to cope with hospitalization/illness  Outcome: Progressing  Goal: Collaborate with me, my family, and caregiver to identify my specific goals  Outcome: Progressing     Problem: Discharge Barriers  Goal: My discharge needs are met  Outcome: Progressing     Problem: Skin  Goal: Decreased wound size/increased tissue granulation at next dressing change  Outcome: Progressing  Flowsheets (Taken 12/23/2023 2225)  Decreased wound size/increased tissue granulation at next dressing change:   Promote sleep for wound healing   Protective dressings over bony prominences   Utilize specialty bed per algorithm  Goal: Participates in plan/prevention/treatment measures  Outcome: Progressing  Flowsheets (Taken 12/23/2023 2225)  Participates in plan/prevention/treatment measures:   Discuss with provider PT/OT consult   Elevate heels   Increase activity/out of bed for meals  Goal: Prevent/manage excess moisture  Outcome: Progressing  Flowsheets (Taken 12/23/2023 2225)  Prevent/manage excess moisture:   Cleanse incontinence/protect with barrier cream   Monitor for/manage infection if present   Follow provider orders for dressing changes   Moisturize dry skin   Use wicking fabric (obtain order)  Goal: Prevent/minimize sheer/friction injuries  Outcome: Progressing  Flowsheets (Taken 12/23/2023 2225)  Prevent/minimize sheer/friction injuries:   Complete micro-shifts as needed if patient unable. Adjust patient position to relieve pressure points, not a full turn   HOB 30 degrees or less   Increase activity/out of bed for meals   Use pull sheet   Turn/reposition every 2  hours/use positioning/transfer devices   Utilize specialty bed per algorithm  Goal: Promote/optimize nutrition  Outcome: Progressing  Flowsheets (Taken 12/23/2023 2225)  Promote/optimize nutrition:   Assist with feeding   Consume > 50% meals/supplements   Discuss with provider if NPO > 2 days   Monitor/record intake including meals   Offer water/supplements/favorite foods   Reassess MST if dietician not consulted  Goal: Promote skin healing  Outcome: Progressing  Flowsheets (Taken 12/23/2023 2225)  Promote skin healing:   Assess skin/pad under line(s)/device(s)   Ensure correct size (line/device) and apply per  instructions   Protective dressings over bony prominences   Rotate device position/do not position patient on device   Turn/reposition every 2 hours/use positioning/transfer devices     Problem: Risk for excess fluid volume  Goal: I will remain free from complications r/t CHF  Outcome: Progressing   The patient's goals for the shift include      The clinical goals for the shift include Pt will remain free of falls.Pt will remain hemodynamically stable     Over the shift, the patient did not make progress toward the following goals. Barriers to progression include . Recommendations to address these barriers include .

## 2023-12-24 NOTE — PROGRESS NOTES
"Emmanuel Luque is a 53 y.o. male on day 2 of admission presenting with Acute on chronic heart failure, unspecified heart failure type (CMS/HCC).    Subjective   Alert and oriented x 3, denies complaints chest pain or pressure, palpitations or feeling rapid heart rate.  States he is feeling improved       Objective     Physical Exam  Vitals and nursing note reviewed.   Constitutional:       General: He is not in acute distress.     Appearance: Normal appearance. He is normal weight. He is not ill-appearing or toxic-appearing.   HENT:      Head: Normocephalic and atraumatic.      Nose: Nose normal.      Mouth/Throat:      Mouth: Mucous membranes are moist.   Cardiovascular:      Rate and Rhythm: Normal rate and regular rhythm.      Heart sounds: No murmur heard.     No friction rub. No gallop.   Pulmonary:      Effort: Pulmonary effort is normal.      Breath sounds: Normal breath sounds.      Comments: Diminished breath sounds left bases, no significant crackles  Abdominal:      Comments: Abdominal distention has improved, abdomen is now soft   Musculoskeletal:      Right lower leg: Edema present.      Left lower leg: Edema present.      Comments: +1 nonpitting lower extremity edema   Skin:     Capillary Refill: Capillary refill takes less than 2 seconds.   Neurological:      Mental Status: He is alert and oriented to person, place, and time.   Psychiatric:         Mood and Affect: Mood normal.         Behavior: Behavior normal.         Thought Content: Thought content normal.         Judgment: Judgment normal.         Last Recorded Vitals  Blood pressure (!) 140/98, pulse 88, temperature 36.6 °C (97.9 °F), temperature source Oral, resp. rate 18, height 1.753 m (5' 9\"), weight 113 kg (249 lb 1.9 oz), SpO2 97 %.  Intake/Output last 3 Shifts:  I/O last 3 completed shifts:  In: 558 (4.9 mL/kg) [P.O.:558]  Out: 3325 (29.4 mL/kg) [Urine:3325 (0.8 mL/kg/hr)]  Weight: 113 kg     Relevant Results  No results found for this or " any previous visit (from the past 24 hour(s)).    Assessment/Plan   Principal Problem:    Acute on chronic heart failure, unspecified heart failure type (CMS/HCC)    Acute on chronic systolic heart failure  Hypertensive disorder  Persistent atrial fibrillation  Rash  Suspected plaque psoriasis  Obesity  Hyperlipidemia     Overall impression:     12/23: Assessed by Jaleel PERDOMO-BC in collaboration with Dr. Olivas on coverage for Dr. Davis.  Patient presents with significant shortness of breath.  As described above.  Worsening course over the past week.  Ran out of his furosemide in the outpatient setting 5 days ago.  Prior to this had been taking extra dosages.  Reports approximately 40 pound weight gain over the past month.  Reports mild rash to his upper extremities which began a year ago, now they are very significant with plaques covering his bilateral upper extremities with a rash now to his back as well as to his lower extremities.  There is suspicion for a plaque psoriasis.  He has a known significantly reduced ejection fraction of 20 to 25%.  States he is generally compliant with medications in the outpatient setting.  Does continue to smoke.  X-ray did reveal fluid volume overload.  He is being diuresed.  Does feel better today compared to admission.  Remains with significant crackles to the right side we will continue with IV diuresis.  He is on guideline directed therapy using Entresto as well as Farxiga and beta-blocker.  He continues in a rate controlled atrial fibrillation.  He is on Xarelto for stroke risk reduction.  His blood pressure stable with most recent 123/56.  Overall this patient is significant decompensated with a complicated heart history.  He appears to be optimized at this time on guideline directed therapy, he arguello require further diuresis.  Will continue on IV furosemide.  Patient has requested a dermatology appointment, I have placed a recommendation for follow-up.  Potassium  is low normal at 3.5 today, given we will give him further diuresis will give him a one-time dose of 40 mill equivalents today.  Most recent echocardiogram is from 8/2023.  No plan to repeat at this time.  Will follow with you.    12/24: Assessed by Jaleel [facility] in collaboration with Dr. Olivas on coverage for Dr. Davis.  Patient states that shortness of breath has improved.  His peripheral edema is nearly resolved.  His abdomen distention has significantly improved and the firmness has resolved.  Believe the patient is nearing a euvolemic state.  Will continue with IV diuresis today with plan to convert him to oral diuretics tomorrow.  He is chest pain-free.  On telemetry monitor he has remained rate controlled in a sinus rhythm with first-degree block alternating with a rate controlled atrial fibrillation. he does complain of a significant rash to his lower extremities, the discoloration of the rash has improved from yesterday, however he is itching significantly.  I have ordered Lac-Hydrin for his upper and lower extremities.  Overall the patient does appear significantly improved.  Believe he would be stable for discharged home on 12/25/2023.  I have also discussed with the patient the importance of discontinuing his cocaine which he tested positive for on admission. Will follow with you.     I spent 35 minutes in the professional and overall care of this patient.      Feliciano Butts, ANDREA-CNP

## 2023-12-24 NOTE — CONSULTS
"Nutrition Assessment Note  Patient states good appetite with recent weight gain d/t fluid retention. Patient agrees to low sodium diet education but seems unwilling/disinterested in applying information.     Nutrition Assessment    Reason for Assessment: Provider consult order (Education)    Reason for Hospital Admission:  Emmanuel Luque is a 53 y.o. male who is admitted for SOB.    Nutrition History:     Energy Intake: Good > 75 %  Food Allergies/Intolerances:  None  GI Symptoms: None  Oral Problems: None    Anthropometrics:  Ht: 175.3 cm (5' 9\"), Wt: 113 kg (249 lb 1.9 oz), BMI: 36.77  IBW/kg (Dietitian Calculated): 72.73 kg  Percent of IBW: 155.6 %  Adjusted Body Weight (kg): 82.73 kg    Weight Change:  Weight History / % Weight Change: Reports 40# weight gain d/t fluid retention in past month        Significant Weight Gain: Fluid related    Nutrition Focused Physical Exam Findings:   Subcutaneous Fat Loss  Orbital Fat Pads: Well nourshed (slightly bulging fat pads)  Buccal Fat Pads: Well nourished (full, rounded cheeks)  Triceps: Well nourished (ample fat tissue)  Ribs: Well nourished (full chest, ribs do not protrude)    Muscle Wasting  Temporalis: Well nourished (well-defined muscle)  Pectoralis (Clavicular Region): Well nourished (clavicle not visible)  Deltoid/Trapezius: Well nourished (rounded appearance at arm, shoulder, neck)  Interosseous: Well nourished (muscle bulges)  Trapezius/Infraspinatus/Supraspinatus (Scapular Region): Well nourished (bones not prominent, muscle taut)  Quadriceps: Well nourished (well developed, well rounded)  Gastrocnemius: Well nourished (well developed bulbous muscle)    Nutrition Significant Labs:  Lab Results   Component Value Date    WBC 6.9 12/23/2023    HGB 13.2 (L) 12/23/2023    HCT 44.1 12/23/2023     12/23/2023    CHOL 177 01/16/2023    TRIG 62 01/16/2023    HDL 46.0 01/16/2023    ALT 12 12/22/2023    AST 19 12/22/2023     12/23/2023    K 3.5 12/23/2023    CL " 103 12/23/2023    CREATININE 1.20 12/23/2023    BUN 20 12/23/2023    CO2 25 12/23/2023    TSH 1.40 12/23/2023    INR 1.2 (H) 06/13/2023    HGBA1C 7.5 (H) 08/21/2023       Current Facility-Administered Medications:     acetaminophen (Tylenol) tablet 650 mg, 650 mg, oral, q6h PRN **OR** acetaminophen (Tylenol) oral liquid 650 mg, 650 mg, nasogastric tube, q6h PRN **OR** acetaminophen (Tylenol) suppository 650 mg, 650 mg, rectal, q6h PRN, Deuce Miramontes MD    ammonium lactate (Lac-Hydrin) 12 % lotion 1 Application, 1 Application, Topical, q1h PRN, ANDREA Doran-CNP    aspirin EC tablet 81 mg, 81 mg, oral, Daily, Deuce Miramontes MD, 81 mg at 12/24/23 0935    atorvastatin (Lipitor) tablet 40 mg, 40 mg, oral, Nightly, Deuce Miramontes MD, 40 mg at 12/23/23 2151    carvedilol (Coreg) tablet 3.125 mg, 3.125 mg, oral, BID with meals, Deuce Miramontes MD, 3.125 mg at 12/24/23 0936    dapagliflozin propanediol (Farxiga) tablet 10 mg, 10 mg, oral, Daily, Deuce Miramontes MD, 10 mg at 12/24/23 0936    dicyclomine (Bentyl) capsule 10 mg, 10 mg, oral, Daily, Deuce Miramontes MD, 10 mg at 12/24/23 0936    furosemide (Lasix) injection 40 mg, 40 mg, intravenous, q12h, KATERIN DoranCNP, 40 mg at 12/24/23 0658    magnesium sulfate IV 2 g, 2 g, intravenous, Once PRN, Rory Irwin, [facility]    magnesium sulfate IV 4 g, 4 g, intravenous, Once PRN, Rory Irwin, [facility]    metFORMIN (Glucophage) tablet 500 mg, 500 mg, oral, BID with meals, Deuce Miramontes MD, 500 mg at 12/24/23 0935    oxyCODONE-acetaminophen (Percocet) 5-325 mg per tablet 1 tablet, 1 tablet, oral, Daily PRN, Deuce Miramontes MD, 1 tablet at 12/23/23 0111    oxygen (O2) therapy, , inhalation, Continuous PRN - O2/gases, Deuce Miramontes MD    pantoprazole (ProtoNix) EC tablet 40 mg, 40 mg, oral, Daily before breakfast, Deuce Miramontes MD, 40 mg at 12/24/23 0700    potassium chloride CR (Klor-Con M20) ER tablet  20 mEq, 20 mEq, oral, Daily, Deuce Miramontes MD, 20 mEq at 12/24/23 0935    psyllium (Metamucil) 3.4 gram packet 1 packet, 1 packet, oral, Daily, Deuce Miramontes MD, 1 packet at 12/24/23 0935    rivaroxaban (Xarelto) tablet 20 mg, 20 mg, oral, Daily with evening meal, Rory Irwin, [facility], 20 mg at 12/23/23 1635    sacubitriL-valsartan (Entresto) 24-26 mg per tablet 1 tablet, 1 tablet, oral, BID, Deuce Miramontes MD, 1 tablet at 12/24/23 0936    spironolactone (Aldactone) tablet 25 mg, 25 mg, oral, Daily, Deuce Miramontes MD, 25 mg at 12/24/23 0935    sucralfate (Carafate) tablet 1 g, 1 g, oral, q12h NYA, Deuce Miramontes MD, 1 g at 12/24/23 0935    thiamine (Vitamin B-1) tablet 100 mg, 100 mg, oral, Daily, Deuce Miramontes MD, 100 mg at 12/24/23 0936    Dietary Orders (From admission, onward)       Start     Ordered    12/22/23 1543  Adult diet Cardiac; 70 gm fat; 2 - 3 grams Sodium  Diet effective now        Question Answer Comment   Diet type Cardiac    Fat restriction: 70 gm fat    Sodium restriction: 2 - 3 grams Sodium        12/22/23 1542                   Estimated Needs:   Estimated Energy Needs  Total Energy Estimated Needs (kCal): 2075 kCal  Total Estimated Energy Need per Day (kCal/kg): 25 kCal/kg  Method for Estimating Needs: Adj Wt    Estimated Protein Needs  Total Protein Estimated Needs (g): 100 g  Total Protein Estimated Needs (g/kg): 1.2 g/kg  Method for Estimating Needs: Adj Wt    Estimated Fluid Needs  Total Fluid Estimated Needs (mL): 2075 mL  Method for Estimating Needs: 1 mL/kcal      Nutrition Diagnosis   Nutrition Diagnosis:  Malnutrition Diagnosis  Patient has Malnutrition Diagnosis: No    Nutrition Diagnosis  Patient has Nutrition Diagnosis: Yes  Diagnosis Status (1): New  Nutrition Diagnosis 1: Food and nutrition related knowledge deficit  Related to (1): Unwilling or disinterested in learning/applying information  As Evidenced by (1): Conditions associated with  diagnosis     Nutrition Interventions/Recommendations   Nutrition Interventions and Recommendations:    Nutrition Prescription:  Individualized Nutrition Prescription Provided for : 2075 calories, 100 gm protein via cardiac diet    Nutrition Interventions:   Food and/or Nutrient Delivery Interventions  Interventions: Meals and snacks  Meals and Snacks: Fat-modified diet, Mineral-modified diet  Goal: Provide as ordered    Education Documentation  Nutrition Care Manual, taught by Estela Cohen RD, LD at 12/24/2023  9:38 AM.  Learner: Patient  Readiness: Acceptance  Method: Explanation, Handout  Response: No Evidence of Learning  Comment: Provided handout and discussed benefits of low sodium diet with respect to CHF and fluid retention. Patient accepts education but appears unlikely to apply, states he doesn't feel nutrition plays a role in his poor health.           Nutrition Monitoring and Evaluation   Monitoring/Evaluation:   Food/Nutrient Related History Monitoring  Monitoring and Evaluation Plan: Energy intake  Energy Intake: Estimated energy intake  Criteria: Patient will consume =/>75% of meals       Time Spent/Follow-up:   Follow Up  Time Spent (min): 30 minutes  Last Date of Nutrition Visit: 12/24/23  Nutrition Follow-Up Needed?: 7-10 days  Follow up Comment: 1/2/24

## 2023-12-25 VITALS
WEIGHT: 249.12 LBS | BODY MASS INDEX: 36.9 KG/M2 | HEART RATE: 121 BPM | SYSTOLIC BLOOD PRESSURE: 104 MMHG | OXYGEN SATURATION: 99 % | HEIGHT: 69 IN | DIASTOLIC BLOOD PRESSURE: 71 MMHG | RESPIRATION RATE: 20 BRPM | TEMPERATURE: 97.5 F

## 2023-12-25 LAB
ANION GAP SERPL CALC-SCNC: 16 MMOL/L
BUN SERPL-MCNC: 20 MG/DL (ref 8–25)
CALCIUM SERPL-MCNC: 8.6 MG/DL (ref 8.5–10.4)
CHLORIDE SERPL-SCNC: 98 MMOL/L (ref 97–107)
CO2 SERPL-SCNC: 21 MMOL/L (ref 24–31)
CREAT SERPL-MCNC: 1.3 MG/DL (ref 0.4–1.6)
ERYTHROCYTE [DISTWIDTH] IN BLOOD BY AUTOMATED COUNT: 18.6 % (ref 11.5–14.5)
GFR SERPL CREATININE-BSD FRML MDRD: 66 ML/MIN/1.73M*2
GLUCOSE BLD MANUAL STRIP-MCNC: 295 MG/DL (ref 74–99)
GLUCOSE SERPL-MCNC: 165 MG/DL (ref 65–99)
HCT VFR BLD AUTO: 51.4 % (ref 41–52)
HGB BLD-MCNC: 15.8 G/DL (ref 13.5–17.5)
MCH RBC QN AUTO: 26.2 PG (ref 26–34)
MCHC RBC AUTO-ENTMCNC: 30.7 G/DL (ref 32–36)
MCV RBC AUTO: 85 FL (ref 80–100)
NRBC BLD-RTO: 0 /100 WBCS (ref 0–0)
PLATELET # BLD AUTO: 348 X10*3/UL (ref 150–450)
POTASSIUM SERPL-SCNC: 3.4 MMOL/L (ref 3.4–5.1)
RBC # BLD AUTO: 6.04 X10*6/UL (ref 4.5–5.9)
SODIUM SERPL-SCNC: 135 MMOL/L (ref 133–145)
WBC # BLD AUTO: 8 X10*3/UL (ref 4.4–11.3)

## 2023-12-25 PROCEDURE — 36415 COLL VENOUS BLD VENIPUNCTURE: CPT | Performed by: INTERNAL MEDICINE

## 2023-12-25 PROCEDURE — 2500000004 HC RX 250 GENERAL PHARMACY W/ HCPCS (ALT 636 FOR OP/ED): Performed by: NURSE PRACTITIONER

## 2023-12-25 PROCEDURE — G0378 HOSPITAL OBSERVATION PER HR: HCPCS

## 2023-12-25 PROCEDURE — 2500000004 HC RX 250 GENERAL PHARMACY W/ HCPCS (ALT 636 FOR OP/ED): Performed by: INTERNAL MEDICINE

## 2023-12-25 PROCEDURE — 80048 BASIC METABOLIC PNL TOTAL CA: CPT | Performed by: INTERNAL MEDICINE

## 2023-12-25 PROCEDURE — 2500000001 HC RX 250 WO HCPCS SELF ADMINISTERED DRUGS (ALT 637 FOR MEDICARE OP): Performed by: INTERNAL MEDICINE

## 2023-12-25 PROCEDURE — 99232 SBSQ HOSP IP/OBS MODERATE 35: CPT | Performed by: INTERNAL MEDICINE

## 2023-12-25 PROCEDURE — 96375 TX/PRO/DX INJ NEW DRUG ADDON: CPT

## 2023-12-25 PROCEDURE — 82947 ASSAY GLUCOSE BLOOD QUANT: CPT

## 2023-12-25 PROCEDURE — 85027 COMPLETE CBC AUTOMATED: CPT | Performed by: INTERNAL MEDICINE

## 2023-12-25 PROCEDURE — 96376 TX/PRO/DX INJ SAME DRUG ADON: CPT

## 2023-12-25 PROCEDURE — 2500000002 HC RX 250 W HCPCS SELF ADMINISTERED DRUGS (ALT 637 FOR MEDICARE OP, ALT 636 FOR OP/ED): Performed by: INTERNAL MEDICINE

## 2023-12-25 PROCEDURE — 96374 THER/PROPH/DIAG INJ IV PUSH: CPT

## 2023-12-25 RX ORDER — METOPROLOL SUCCINATE 25 MG/1
25 TABLET, EXTENDED RELEASE ORAL DAILY
Qty: 30 TABLET | Refills: 0 | Status: SHIPPED | OUTPATIENT
Start: 2023-12-25 | End: 2024-01-24

## 2023-12-25 RX ADMIN — DAPAGLIFLOZIN 10 MG: 10 TABLET, FILM COATED ORAL at 08:32

## 2023-12-25 RX ADMIN — CARVEDILOL 3.12 MG: 3.12 TABLET, FILM COATED ORAL at 08:31

## 2023-12-25 RX ADMIN — SUCRALFATE 1 G: 1 TABLET ORAL at 08:32

## 2023-12-25 RX ADMIN — POTASSIUM CHLORIDE 20 MEQ: 1500 TABLET, EXTENDED RELEASE ORAL at 08:32

## 2023-12-25 RX ADMIN — THIAMINE HCL TAB 100 MG 100 MG: 100 TAB at 08:32

## 2023-12-25 RX ADMIN — SPIRONOLACTONE 25 MG: 25 TABLET ORAL at 08:32

## 2023-12-25 RX ADMIN — PANTOPRAZOLE SODIUM 40 MG: 40 TABLET, DELAYED RELEASE ORAL at 06:18

## 2023-12-25 RX ADMIN — ASPIRIN 81 MG: 81 TABLET, COATED ORAL at 08:31

## 2023-12-25 RX ADMIN — DICYCLOMINE HYDROCHLORIDE 10 MG: 10 CAPSULE ORAL at 08:32

## 2023-12-25 RX ADMIN — METFORMIN HYDROCHLORIDE 500 MG: 500 TABLET, FILM COATED ORAL at 08:32

## 2023-12-25 RX ADMIN — SACUBITRIL AND VALSARTAN 1 TABLET: 24; 26 TABLET, FILM COATED ORAL at 08:32

## 2023-12-25 RX ADMIN — FUROSEMIDE 40 MG: 10 INJECTION, SOLUTION INTRAMUSCULAR; INTRAVENOUS at 06:42

## 2023-12-25 ASSESSMENT — PAIN - FUNCTIONAL ASSESSMENT: PAIN_FUNCTIONAL_ASSESSMENT: 0-10

## 2023-12-25 ASSESSMENT — COGNITIVE AND FUNCTIONAL STATUS - GENERAL
MOBILITY SCORE: 24
DAILY ACTIVITIY SCORE: 24
MOBILITY SCORE: 24

## 2023-12-25 ASSESSMENT — PAIN SCALES - GENERAL
PAINLEVEL_OUTOF10: 0 - NO PAIN
PAINLEVEL_OUTOF10: 0 - NO PAIN

## 2023-12-25 NOTE — CARE PLAN
The patient's goals for the shift include      The clinical goals for the shift include no falls        Problem: Pain  Goal: My pain/discomfort is manageable  Outcome: Progressing     Problem: Safety  Goal: Patient will be injury free during hospitalization  Outcome: Progressing  Goal: I will remain free of falls  Outcome: Progressing     Problem: Daily Care  Goal: Daily care needs are met  Outcome: Progressing     Problem: Psychosocial Needs  Goal: Demonstrates ability to cope with hospitalization/illness  Outcome: Progressing  Goal: Collaborate with me, my family, and caregiver to identify my specific goals  Outcome: Progressing     Problem: Discharge Barriers  Goal: My discharge needs are met  Outcome: Progressing     Problem: Skin  Goal: Decreased wound size/increased tissue granulation at next dressing change  Outcome: Progressing  Flowsheets (Taken 12/25/2023 1109)  Decreased wound size/increased tissue granulation at next dressing change:   Promote sleep for wound healing   Utilize specialty bed per algorithm   Protective dressings over bony prominences  Goal: Participates in plan/prevention/treatment measures  Outcome: Progressing  Flowsheets (Taken 12/25/2023 1109)  Participates in plan/prevention/treatment measures:   Discuss with provider PT/OT consult   Elevate heels   Increase activity/out of bed for meals  Goal: Prevent/manage excess moisture  Outcome: Progressing  Flowsheets (Taken 12/25/2023 1109)  Prevent/manage excess moisture:   Moisturize dry skin   Cleanse incontinence/protect with barrier cream   Monitor for/manage infection if present   Use wicking fabric (obtain order)  Goal: Prevent/minimize sheer/friction injuries  Outcome: Progressing  Flowsheets (Taken 12/25/2023 1109)  Prevent/minimize sheer/friction injuries:   Increase activity/out of bed for meals   Use pull sheet   Utilize specialty bed per algorithm  Goal: Promote/optimize nutrition  Outcome: Progressing  Flowsheets (Taken  12/25/2023 1109)  Promote/optimize nutrition:   Assist with feeding   Consume > 50% meals/supplements   Monitor/record intake including meals   Offer water/supplements/favorite foods  Goal: Promote skin healing  Outcome: Progressing  Flowsheets (Taken 12/25/2023 1109)  Promote skin healing:   Assess skin/pad under line(s)/device(s)   Ensure correct size (line/device) and apply per  instructions   Protective dressings over bony prominences   Rotate device position/do not position patient on device     Problem: Risk for excess fluid volume  Goal: I will remain free from complications r/t CHF  Outcome: Progressing

## 2023-12-25 NOTE — PROGRESS NOTES
INTERNAL MEDICINE  NOTE                                            PATIENT NAME: Emmanuel Luque    MRN: [id number]  SERVICE DATE: 12/25/2023      ASSESSMENT and PLAN:      Acute on chronic heart failure, unspecified heart failure type (CMS/HCC)  Hypertension  Coronary artery disease  - cardio following  - Cont IV lasix   - entresto      Atrial fibrillation- xarelto         DM type II- cont home meds and monitor             Continue current medications.  Supportive care.  Physical therapy and Occupational Therapy -as needed.  Continue to monitor labs. Continue DVT, aspiration, decubitus precautions.         SUBJECTIVE:    Pt is seen and examined. No new issues.     OBJECTIVE:      Current Facility-Administered Medications:     acetaminophen (Tylenol) tablet 650 mg, 650 mg, oral, q6h PRN **OR** acetaminophen (Tylenol) oral liquid 650 mg, 650 mg, nasogastric tube, q6h PRN **OR** acetaminophen (Tylenol) suppository 650 mg, 650 mg, rectal, q6h PRN, Deuce Miramontes MD    ammonium lactate (Lac-Hydrin) 12 % lotion 1 Application, 1 Application, Topical, q1h PRN, ANDREA Doran-CNP    aspirin EC tablet 81 mg, 81 mg, oral, Daily, Deuce Miramontes MD, 81 mg at 12/25/23 0831    atorvastatin (Lipitor) tablet 40 mg, 40 mg, oral, Nightly, Deuce Miramontes MD, 40 mg at 12/24/23 2127    carvedilol (Coreg) tablet 3.125 mg, 3.125 mg, oral, BID with meals, Deuce Miramontes MD, 3.125 mg at 12/25/23 0831    dapagliflozin propanediol (Farxiga) tablet 10 mg, 10 mg, oral, Daily, Deuce Miramontes MD, 10 mg at 12/25/23 0832    dicyclomine (Bentyl) capsule 10 mg, 10 mg, oral, Daily, Deuce Miramontes MD, 10 mg at 12/25/23 0832    furosemide (Lasix) injection 40 mg, 40 mg, intravenous, q12h, KATERIN DoranCNP, 40 mg at 12/25/23 0642    magnesium sulfate IV 2 g, 2 g, intravenous, Once PRN, Rory Irwin, RPh    magnesium sulfate IV 4 g, 4 g, intravenous, Once PRN, Rory Irwin, RP    metFORMIN  "(Glucophage) tablet 500 mg, 500 mg, oral, BID with meals, Deuce Miramontes MD, 500 mg at 12/25/23 0832    oxyCODONE-acetaminophen (Percocet) 5-325 mg per tablet 1 tablet, 1 tablet, oral, Daily PRN, Deuce Miramontes MD, 1 tablet at 12/23/23 0111    oxygen (O2) therapy, , inhalation, Continuous PRN - O2/gases, Deuce Miramontes MD    pantoprazole (ProtoNix) EC tablet 40 mg, 40 mg, oral, Daily before breakfast, Deuce Miramontes MD, 40 mg at 12/25/23 0618    potassium chloride CR (Klor-Con M20) ER tablet 20 mEq, 20 mEq, oral, Daily, Deuce Miramontes MD, 20 mEq at 12/25/23 0832    psyllium (Metamucil) 3.4 gram packet 1 packet, 1 packet, oral, Daily, Deuce Miramontes MD, 1 packet at 12/24/23 0935    rivaroxaban (Xarelto) tablet 20 mg, 20 mg, oral, Daily with evening meal, Rory Irwin, [facility], 20 mg at 12/24/23 1624    sacubitriL-valsartan (Entresto) 24-26 mg per tablet 1 tablet, 1 tablet, oral, BID, Deuce Miramontes MD, 1 tablet at 12/25/23 0832    spironolactone (Aldactone) tablet 25 mg, 25 mg, oral, Daily, Deuce Miramontes MD, 25 mg at 12/25/23 0832    sucralfate (Carafate) tablet 1 g, 1 g, oral, q12h NYA, Deuce Miramontes MD, 1 g at 12/25/23 0832    thiamine (Vitamin B-1) tablet 100 mg, 100 mg, oral, Daily, Deuce Miramontes MD, 100 mg at 12/25/23 0832        VITAL SIGNS:  /71 (BP Location: Right arm, Patient Position: Sitting)   Pulse (!) 121   Temp 36.4 °C (97.5 °F)   Resp 20   Ht 1.753 m (5' 9\")   Wt 113 kg (249 lb 1.9 oz)   SpO2 99%   BMI 36.79 kg/m²         PHYSICAL EXAM:  General: Awake and Alert, in no distress  Head - Normocephalic, atraumatic  Cardiac: S1S2 wnl, No MGR  Lungs: Clear to auscultation, no rales  Abdomen: Soft, non-tender  Extremities: No obvious deformity          DATA:   Diagnostic tests reviewed for today's visit:          Results for orders placed or performed during the hospital encounter of 12/22/23 (from the past 24 hour(s))   CBC   Result Value " Ref Range    WBC 7.7 4.4 - 11.3 x10*3/uL    nRBC 0.0 0.0 - 0.0 /100 WBCs    RBC 5.94 (H) 4.50 - 5.90 x10*6/uL    Hemoglobin 15.4 13.5 - 17.5 g/dL    Hematocrit 51.0 41.0 - 52.0 %    MCV 86 80 - 100 fL    MCH 25.9 (L) 26.0 - 34.0 pg    MCHC 30.2 (L) 32.0 - 36.0 g/dL    RDW 18.4 (H) 11.5 - 14.5 %    Platelets 360 150 - 450 x10*3/uL   Basic Metabolic Panel   Result Value Ref Range    Glucose 176 (H) 65 - 99 mg/dL    Sodium 133 133 - 145 mmol/L    Potassium 4.5 3.4 - 5.1 mmol/L    Chloride 99 97 - 107 mmol/L    Bicarbonate 23 (L) 24 - 31 mmol/L    Urea Nitrogen 16 8 - 25 mg/dL    Creatinine 1.40 0.40 - 1.60 mg/dL    eGFR 60 (L) >60 mL/min/1.73m*2    Calcium 8.8 8.5 - 10.4 mg/dL    Anion Gap 11 <=19 mmol/L   CBC   Result Value Ref Range    WBC 8.0 4.4 - 11.3 x10*3/uL    nRBC 0.0 0.0 - 0.0 /100 WBCs    RBC 6.04 (H) 4.50 - 5.90 x10*6/uL    Hemoglobin 15.8 13.5 - 17.5 g/dL    Hematocrit 51.4 41.0 - 52.0 %    MCV 85 80 - 100 fL    MCH 26.2 26.0 - 34.0 pg    MCHC 30.7 (L) 32.0 - 36.0 g/dL    RDW 18.6 (H) 11.5 - 14.5 %    Platelets 348 150 - 450 x10*3/uL   Basic Metabolic Panel   Result Value Ref Range    Glucose 165 (H) 65 - 99 mg/dL    Sodium 135 133 - 145 mmol/L    Potassium 3.4 3.4 - 5.1 mmol/L    Chloride 98 97 - 107 mmol/L    Bicarbonate 21 (L) 24 - 31 mmol/L    Urea Nitrogen 20 8 - 25 mg/dL    Creatinine 1.30 0.40 - 1.60 mg/dL    eGFR 66 >60 mL/min/1.73m*2    Calcium 8.6 8.5 - 10.4 mg/dL    Anion Gap 16 <=19 mmol/L           SIGNATURE    Javed Contreras MD  DATE: December 25, 2023

## 2023-12-25 NOTE — CARE PLAN
Problem: Pain  Goal: My pain/discomfort is manageable  Outcome: Progressing     Problem: Safety  Goal: Patient will be injury free during hospitalization  Outcome: Progressing  Goal: I will remain free of falls  Outcome: Progressing     Problem: Daily Care  Goal: Daily care needs are met  Outcome: Progressing     Problem: Psychosocial Needs  Goal: Demonstrates ability to cope with hospitalization/illness  Outcome: Progressing  Goal: Collaborate with me, my family, and caregiver to identify my specific goals  Outcome: Progressing     Problem: Discharge Barriers  Goal: My discharge needs are met  Outcome: Progressing     Problem: Skin  Goal: Decreased wound size/increased tissue granulation at next dressing change  Outcome: Progressing  Flowsheets (Taken 12/25/2023 0435)  Decreased wound size/increased tissue granulation at next dressing change:   Promote sleep for wound healing   Protective dressings over bony prominences   Utilize specialty bed per algorithm  Goal: Participates in plan/prevention/treatment measures  Outcome: Progressing  Flowsheets (Taken 12/25/2023 0435)  Participates in plan/prevention/treatment measures:   Discuss with provider PT/OT consult   Elevate heels   Increase activity/out of bed for meals  Goal: Prevent/manage excess moisture  Outcome: Progressing  Flowsheets (Taken 12/25/2023 0435)  Prevent/manage excess moisture:   Cleanse incontinence/protect with barrier cream   Follow provider orders for dressing changes   Moisturize dry skin   Monitor for/manage infection if present   Use wicking fabric (obtain order)  Goal: Prevent/minimize sheer/friction injuries  Outcome: Progressing  Flowsheets (Taken 12/25/2023 0435)  Prevent/minimize sheer/friction injuries:   Complete micro-shifts as needed if patient unable. Adjust patient position to relieve pressure points, not a full turn   HOB 30 degrees or less   Increase activity/out of bed for meals   Turn/reposition every 2 hours/use  positioning/transfer devices   Use pull sheet   Utilize specialty bed per algorithm  Goal: Promote/optimize nutrition  Outcome: Progressing  Flowsheets (Taken 12/25/2023 0435)  Promote/optimize nutrition:   Assist with feeding   Consume > 50% meals/supplements   Discuss with provider if NPO > 2 days   Monitor/record intake including meals   Offer water/supplements/favorite foods   Reassess MST if dietician not consulted  Goal: Promote skin healing  Outcome: Progressing  Flowsheets (Taken 12/25/2023 0435)  Promote skin healing:   Assess skin/pad under line(s)/device(s)   Ensure correct size (line/device) and apply per  instructions   Protective dressings over bony prominences   Rotate device position/do not position patient on device   Turn/reposition every 2 hours/use positioning/transfer devices     Problem: Risk for excess fluid volume  Goal: I will remain free from complications r/t CHF  Outcome: Progressing   The patient's goals for the shift include      The clinical goals for the shift include no falls    Over the shift, the patient did not make progress toward the following goals. Barriers to progression include . Recommendations to address these barriers include .

## 2023-12-25 NOTE — PROGRESS NOTES
Subjective Data:  Breathing comfortably supine, wants to go home    Overnight Events:    None noted     Objective Data:  Last Recorded Vitals:  Vitals:    12/24/23 1500 12/24/23 2333 12/25/23 0700 12/25/23 0841   BP: 97/66 101/61 104/71    BP Location: Left arm Left arm Right arm    Patient Position: Lying Sitting Sitting    Pulse: 85 56 (!) 121    Resp: 17 17 20    Temp: 36.4 °C (97.5 °F) 36.4 °C (97.5 °F)  36.4 °C (97.5 °F)   TempSrc: Oral Oral     SpO2: 95% 99% 99%    Weight:       Height:           Last Labs:  CBC - 12/25/2023:  4:24 AM  8.0 15.8 348    51.4      CMP - 12/25/2023:  4:24 AM  8.6 6.8 19 --- 2.1   _ 3.3 12 147      PTT - 6/13/2023:  8:31 AM  1.2   12.1 28.0     TROPHS   Date/Time Value Ref Range Status   10/13/2022 10:40 AM 4 0 - 53 ng/L Final     Comment:     .  Less than 99th percentile of normal range cutoff-  Female and children under 18 years old <35 ng/L; Male <54 ng/L: Negative  Repeat testing should be performed if clinically indicated.   .  Female and children under 18 years old  ng/L; Male  ng/L:  Consistent with possible cardiac damage and possible increased clinical   risk. Serial measurements may help to assess extent of myocardial damage.   .  >120 ng/L: Consistent with cardiac damage, increased clinical risk and  myocardial infarction. Serial measurements may help assess extent of   myocardial damage.   .   NOTE: Children less than 1 year old may have higher baseline troponin   levels and results should be interpreted in conjunction with the overall   clinical context.  .  NOTE: Troponin I testing is performed using a different   testing methodology at [facility] than at other   [facility] hospitals. Direct result comparisons should only   be made within the same method.     10/13/2022 10:40 AM 49 0 - 53 ng/L Final     Comment:     .  Less than 99th percentile of normal range cutoff-  Female and children under 18 years old <35 ng/L; Male <54 ng/L:  Negative  Repeat testing should be performed if clinically indicated.   .  Female and children under 18 years old  ng/L; Male  ng/L:  Consistent with possible cardiac damage and possible increased clinical   risk. Serial measurements may help to assess extent of myocardial damage.   .  >120 ng/L: Consistent with cardiac damage, increased clinical risk and  myocardial infarction. Serial measurements may help assess extent of   myocardial damage.   .   NOTE: Children less than 1 year old may have higher baseline troponin   levels and results should be interpreted in conjunction with the overall   clinical context.  .  NOTE: Troponin I testing is performed using a different   testing methodology at [facility] than at other   [facility]. Direct result comparisons should only   be made within the same method.       BNP   Date/Time Value Ref Range Status   12/28/2022 02:27  0 - 99 pg/mL Final     Comment:     .  <100 pg/mL - Heart failure unlikely  100-299 pg/mL - Intermediate probability of acute heart  .               failure exacerbation. Correlate with clinical  .               context and patient history.    >=300 pg/mL - Heart Failure likely. Correlate with clinical  .               context and patient history.   Biotin interference may cause falsely decreased results.   Patients taking a Biotin dose of up to 5 mg/day should   refrain from taking Biotin for 24 hours before sample   collection. Providers may contact their local laboratory   for further information.     10/13/2022 10:41 AM 20 0 - 99 pg/mL Final     Comment:     .  <100 pg/mL - Heart failure unlikely  100-299 pg/mL - Intermediate probability of acute heart  .               failure exacerbation. Correlate with clinical  .               context and patient history.    >=300 pg/mL - Heart Failure likely. Correlate with clinical  .               context and patient history.   Biotin interference may cause falsely  decreased results.   Patients taking a Biotin dose of up to 5 mg/day should   refrain from taking Biotin for 24 hours before sample   collection. Providers may contact their local laboratory   for further information.       HGBA1C   Date/Time Value Ref Range Status   08/21/2023 05:42 AM 7.5 4.0 - 6.0 % Final     Comment:     Hemoglobin A1C levels are related to mean blood glucose during the   preceding 2-3 months. The relationship table below may be used as a   general guide. Each 1% increase in HGB A1C is a reflection of an   increase in mean glucose of approximately 30 mg/dl.   Reference: Diabetes Care, volume 29, supplement 1 Jan. 2006                        HGB A1C ................. Approx. Mean Glucose   _______________________________________________   6%   ...............................  120 mg/dl   7%   ...............................  150 mg/dl   8%   ...............................  180 mg/dl   9%   ...............................  210 mg/dl   10%  ...............................  240 mg/dl  Performed at [address] [address]     11/24/2022 05:28 AM 7.8 4.0 - 6.0 % Final     Comment:     Hemoglobin A1C levels are related to mean blood glucose during the   preceding 2-3 months. The relationship table below may be used as a   general guide. Each 1% increase in HGB A1C is a reflection of an   increase in mean glucose of approximately 30 mg/dl.   Reference: Diabetes Care, volume 29, supplement 1 Jan. 2006                        HGB A1C ................. Approx. Mean Glucose   _______________________________________________   6%   ...............................  120 mg/dl   7%   ...............................  150 mg/dl   8%   ...............................  180 mg/dl   9%   ...............................  210 mg/dl   10%  ...............................  240 mg/dl  Performed at [facility] [address] [address] Counts include 234 beds at the [facility] [address]       [facility]   Date/Time Value Ref Range Status  "  01/16/2023 01:04 PM 12 0 - 40 mg/dL Final   12/28/2022 02:27 PM 22 0 - 40 mg/dL Final      Last I/O:  I/O last 3 completed shifts:  In: 768 (6.8 mL/kg) [P.O.:768]  Out: 725 (6.4 mL/kg) [Urine:725 (0.2 mL/kg/hr)]  Weight: 113 kg     Past Cardiology Tests (Last 3 Years):  EKG:  ECG 12 lead 12/22/2023    Echo:  No results found for this or any previous visit from the past 1095 days.    Ejection Fractions:  No results found for: \"EF\"  Cath:  No results found for this or any previous visit from the past 1095 days.    Stress Test:  No results found for this or any previous visit from the past 1095 days.    Cardiac Imaging:  MR CARDIAC MORPHOLOGY AND FUNCTION W AND WO IV CONTRAST 12/30/2022      Inpatient Medications:  Scheduled medications   Medication Dose Route Frequency    aspirin  81 mg oral Daily    atorvastatin  40 mg oral Nightly    carvedilol  3.125 mg oral BID with meals    dapagliflozin propanediol  10 mg oral Daily    dicyclomine  10 mg oral Daily    furosemide  40 mg intravenous q12h    metFORMIN  500 mg oral BID with meals    pantoprazole  40 mg oral Daily before breakfast    potassium chloride CR  20 mEq oral Daily    psyllium  1 packet oral Daily    rivaroxaban  20 mg oral Daily with evening meal    sacubitriL-valsartan  1 tablet oral BID    spironolactone  25 mg oral Daily    sucralfate  1 g oral q12h NYA    thiamine  100 mg oral Daily     PRN medications   Medication    acetaminophen    Or    acetaminophen    Or    acetaminophen    ammonium lactate    magnesium sulfate    magnesium sulfate    oxyCODONE-acetaminophen    oxygen     Continuous Medications   Medication Dose Last Rate       Physical Exam:  Neck:  Normal jugular venous pressure, Lungs:  Clear to auscultation without wheezing or rhonchi or rales  Heart:  Regular rate and rhythm normal S1 and S2, no murmurs gallops rub  Abdomen:  Soft, good bowel sounds, nontender,   Extremities:  Good radial, femoral, pedal pulses without pretibial " edema  Neurological:  No focal sensory or motor deficits,   Assessment/Plan   [place] on chronic heart failure, unspecified heart failure type (CMS/HCC)     Acute on chronic systolic heart failure  Hypertensive disorder  Persistent atrial fibrillation  Rash  Suspected plaque psoriasis  Obesity  Hyperlipidemia     Overall impression:     12/23: Assessed by Jaleel BURCHBC in collaboration with Dr. Olivas on coverage for Dr. Davis.  Patient presents with significant shortness of breath.  As described above.  Worsening course over the past week.  Ran out of his furosemide in the outpatient setting 5 days ago.  Prior to this had been taking extra dosages.  Reports approximately 40 pound weight gain over the past month.  Reports mild rash to his upper extremities which began a year ago, now they are very significant with plaques covering his bilateral upper extremities with a rash now to his back as well as to his lower extremities.  There is suspicion for a plaque psoriasis.  He has a known significantly reduced ejection fraction of 20 to 25%.  States he is generally compliant with medications in the outpatient setting.  Does continue to smoke.  X-ray did reveal fluid volume overload.  He is being diuresed.  Does feel better today compared to admission.  Remains with significant crackles to the right side we will continue with IV diuresis.  He is on guideline directed therapy using Entresto as well as Farxiga and beta-blocker.  He continues in a rate controlled atrial fibrillation.  He is on Xarelto for stroke risk reduction.  His blood pressure stable with most recent 123/56.  Overall this patient is significant decompensated with a complicated heart history.  He appears to be optimized at this time on guideline directed therapy, he arguello require further diuresis.  Will continue on IV furosemide.  Patient has requested a dermatology appointment, I have placed a recommendation for follow-up.  Potassium is low normal  at 3.5 today, given we will give him further diuresis will give him a one-time dose of 40 mill equivalents today.  Most recent echocardiogram is from 8/2023.  No plan to repeat at this time.  Will follow with you.     12/24: Assessed by Jaleel [facility] in collaboration with Dr. Olivas on coverage for Dr. Davis.  Patient states that shortness of breath has improved.  His peripheral edema is nearly resolved.  His abdomen distention has significantly improved and the firmness has resolved.  Believe the patient is nearing a euvolemic state.  Will continue with IV diuresis today with plan to convert him to oral diuretics tomorrow.  He is chest pain-free.  On telemetry monitor he has remained rate controlled in a sinus rhythm with first-degree block alternating with a rate controlled atrial fibrillation. he does complain of a significant rash to his lower extremities, the discoloration of the rash has improved from yesterday, however he is itching significantly.  I have ordered Lac-Hydrin for his upper and lower extremities.  Overall the patient does appear significantly improved.  Believe he would be stable for discharged home on 12/25/2023.  I have also discussed with the patient the importance of discontinuing his cocaine which he tested positive for on admission. Will follow with you.     12/25: Diuresed well overnight, breathing easily. 104/71, pulse 56, respirations 20, O2 sat 99% on room air.  Okay for discharge home and we discussed the importance of compliance with his medication, and also value of support socks with a history of venous insufficiency.         Code Status:  Full Code    I spent 20 minutes in the professional and overall care of this patient.        Shakir Olivas, DO

## 2023-12-25 NOTE — NURSING NOTE
Assumed care of patient. Patient is resting in bed. He is asking about discharge today. He was made aware that the doctor would be around to see him first and write an order if he is being discharged. Call light is in place will continue to monitor.

## 2023-12-25 NOTE — DISCHARGE SUMMARY
DISCHARGE SUMMARY     PATIENT NAME: Emmanuel Luque Full Code   MRN: [id number]          ADMIT DATE: 12/22/2023  DISCHARGE DATE:  12/25/2023     MY DOCTORS AND MEDICAL TEAM:  Primary Care Provider: Deuce Miramontes MD        Past Medical History:   Diagnosis Date    CHF (congestive heart failure) (CMS/HCC)     Hypertension            HPI  Acute on chronic heart failure, unspecified heart failure type (CMS/HCC)  Hypertension  Coronary artery disease  - cardio following  - Cont lasix   - entresto      Atrial fibrillation- xarelto / resume bb        DM type II- cont home meds and monitor             Pt condition has improved since admission and looks stable for discharge.        FOLLOW-UP APPOINTMENTS ALREADY SCHEDULED WITH PROVIDER:  No future appointments.      DISCHARGE MEDICATION:      Your medication list        CHANGE how you take these medications        Instructions Last Dose Given Next Dose Due   metoprolol succinate XL 25 mg 24 hr tablet  Commonly known as: Toprol-XL  What changed:   medication strength  how much to take      Take 1 tablet (25 mg) by mouth once daily.              CONTINUE taking these medications        Instructions Last Dose Given Next Dose Due   aspirin 81 mg EC tablet           atorvastatin 40 mg tablet  Commonly known as: Lipitor           Carafate 1 gram tablet  Generic drug: sucralfate           Coreg 3.125 mg tablet  Generic drug: carvedilol           dapagliflozin propanediol 10 mg  Commonly known as: Farxiga           dicyclomine 10 mg capsule  Commonly known as: Bentyl           furosemide 40 mg tablet  Commonly known as: Lasix           metFORMIN 500 mg tablet  Commonly known as: Glucophage           pantoprazole 40 mg EC tablet  Commonly known as: ProtoNix           Percocet 5-325 mg tablet  Generic drug: oxyCODONE-acetaminophen           rivaroxaban 20 mg tablet  Commonly known as: Xarelto           sacubitriL-valsartan 24-26 mg tablet  Commonly known as: Entresto            spironolactone 25 mg tablet  Commonly known as: Aldactone           thiamine 100 mg tablet  Commonly known as: Vitamin B-1                  STOP taking these medications      Cipro 500 mg tablet  Generic drug: ciprofloxacin        lisinopril 5 mg tablet        metoprolol tartrate 100 mg tablet  Commonly known as: Lopressor                  Where to Get Your Medications        These medications were sent to [email or web address] [id number] [place], [place] [address]      Phone: [phone]   metoprolol succinate XL 25 mg 24 hr tablet               SIGNATURE: Javed Contreras MD    DATE: December 25, 2023    TIME: 12:42 PM

## 2023-12-26 ENCOUNTER — HOSPITAL ENCOUNTER (OUTPATIENT)
Dept: CARDIOLOGY | Facility: CLINIC | Age: 53
Discharge: HOME | End: 2023-12-26

## 2024-01-15 ENCOUNTER — HOSPITAL ENCOUNTER (OUTPATIENT)
Dept: CARDIOLOGY | Facility: CLINIC | Age: 54
Discharge: HOME | End: 2024-01-15

## 2024-01-22 ENCOUNTER — HOSPITAL ENCOUNTER (OUTPATIENT)
Dept: CARDIOLOGY | Facility: CLINIC | Age: 54
Discharge: HOME | End: 2024-01-22

## 2024-01-22 PROCEDURE — 93296 REM INTERROG EVL PM/IDS: CPT

## 2024-01-22 PROCEDURE — 93295 DEV INTERROG REMOTE 1/2/MLT: CPT | Performed by: INTERNAL MEDICINE

## 2024-01-29 ENCOUNTER — HOSPITAL ENCOUNTER (OUTPATIENT)
Dept: CARDIOLOGY | Facility: CLINIC | Age: 54
Discharge: HOME | End: 2024-01-29

## 2024-01-29 DIAGNOSIS — I42.9 CARDIOMYOPATHY, UNSPECIFIED TYPE (MULTI): ICD-10-CM

## 2024-01-29 DIAGNOSIS — Z95.810 ICD (IMPLANTABLE CARDIOVERTER-DEFIBRILLATOR) IN PLACE: ICD-10-CM

## 2024-01-29 PROCEDURE — 93295 DEV INTERROG REMOTE 1/2/MLT: CPT | Performed by: INTERNAL MEDICINE

## 2024-01-29 PROCEDURE — 93296 REM INTERROG EVL PM/IDS: CPT

## 2024-04-29 ENCOUNTER — HOSPITAL ENCOUNTER (OUTPATIENT)
Dept: CARDIOLOGY | Facility: CLINIC | Age: 54
Discharge: HOME | End: 2024-04-29

## 2024-04-29 DIAGNOSIS — I42.9 CARDIOMYOPATHY, UNSPECIFIED TYPE (MULTI): ICD-10-CM

## 2024-04-29 DIAGNOSIS — Z95.810 ICD (IMPLANTABLE CARDIOVERTER-DEFIBRILLATOR) IN PLACE: ICD-10-CM

## 2024-04-29 PROCEDURE — 93295 DEV INTERROG REMOTE 1/2/MLT: CPT | Performed by: INTERNAL MEDICINE

## 2024-04-29 PROCEDURE — 93296 REM INTERROG EVL PM/IDS: CPT

## 2024-05-03 ENCOUNTER — HOSPITAL ENCOUNTER (OUTPATIENT)
Dept: CARDIOLOGY | Facility: CLINIC | Age: 54
Discharge: HOME | End: 2024-05-03

## 2024-05-03 DIAGNOSIS — I48.0 PAROXYSMAL ATRIAL FIBRILLATION (MULTI): ICD-10-CM

## 2024-05-03 DIAGNOSIS — I25.5 ISCHEMIC CARDIOMYOPATHY: ICD-10-CM

## 2024-05-03 DIAGNOSIS — Z95.810 AICD (AUTOMATIC CARDIOVERTER/DEFIBRILLATOR) PRESENT: ICD-10-CM

## 2024-05-03 DIAGNOSIS — I50.43 ACUTE ON CHRONIC COMBINED SYSTOLIC AND DIASTOLIC HEART FAILURE (MULTI): ICD-10-CM

## 2024-06-20 ENCOUNTER — HOSPITAL ENCOUNTER (OUTPATIENT)
Dept: CARDIOLOGY | Facility: CLINIC | Age: 54
Discharge: HOME | End: 2024-06-20

## 2024-06-20 DIAGNOSIS — I50.9 CONGESTIVE HEART FAILURE, UNSPECIFIED HF CHRONICITY, UNSPECIFIED HEART FAILURE TYPE (MULTI): ICD-10-CM

## 2024-06-20 LAB — BODY SURFACE AREA: 2.35 M2

## 2024-06-24 ENCOUNTER — HOSPITAL ENCOUNTER (OUTPATIENT)
Dept: CARDIOLOGY | Facility: CLINIC | Age: 54
Discharge: HOME | End: 2024-06-24

## 2024-06-24 DIAGNOSIS — I50.9 CONGESTIVE HEART FAILURE, UNSPECIFIED HF CHRONICITY, UNSPECIFIED HEART FAILURE TYPE (MULTI): ICD-10-CM

## 2024-07-31 ENCOUNTER — HOSPITAL ENCOUNTER (OUTPATIENT)
Dept: CARDIOLOGY | Facility: CLINIC | Age: 54
Discharge: HOME | End: 2024-07-31
Payer: COMMERCIAL

## 2024-07-31 DIAGNOSIS — I42.9 CARDIOMYOPATHY, UNSPECIFIED (MULTI): ICD-10-CM

## 2024-07-31 DIAGNOSIS — Z95.810 PRESENCE OF AUTOMATIC (IMPLANTABLE) CARDIAC DEFIBRILLATOR: ICD-10-CM

## 2024-07-31 PROCEDURE — 93295 DEV INTERROG REMOTE 1/2/MLT: CPT | Performed by: INTERNAL MEDICINE

## 2024-07-31 PROCEDURE — 93296 REM INTERROG EVL PM/IDS: CPT
